# Patient Record
Sex: FEMALE | Race: WHITE | Employment: FULL TIME | URBAN - METROPOLITAN AREA
[De-identification: names, ages, dates, MRNs, and addresses within clinical notes are randomized per-mention and may not be internally consistent; named-entity substitution may affect disease eponyms.]

---

## 2019-01-20 ENCOUNTER — HOSPITAL ENCOUNTER (EMERGENCY)
Age: 59
Discharge: HOME | End: 2019-01-20
Attending: EMERGENCY MEDICINE
Payer: COMMERCIAL

## 2019-01-20 VITALS
RESPIRATION RATE: 12 BRPM | SYSTOLIC BLOOD PRESSURE: 116 MMHG | OXYGEN SATURATION: 98 % | WEIGHT: 250 LBS | DIASTOLIC BLOOD PRESSURE: 63 MMHG | BODY MASS INDEX: 37.89 KG/M2 | HEIGHT: 68 IN | TEMPERATURE: 97.2 F | HEART RATE: 53 BPM

## 2019-01-20 DIAGNOSIS — T18.108A FOREIGN BODY IN ESOPHAGUS, INITIAL ENCOUNTER: Primary | ICD-10-CM

## 2019-01-20 DIAGNOSIS — K22.2 ESOPHAGEAL STRICTURE: ICD-10-CM

## 2019-01-20 DIAGNOSIS — K25.3 ACUTE GASTRIC EROSION: ICD-10-CM

## 2019-01-20 PROCEDURE — 96374 THER/PROPH/DIAG INJ IV PUSH: CPT

## 2019-01-20 PROCEDURE — 43215 ESOPHAGOSCOPY FLEX REMOVE FB: CPT

## 2019-01-20 PROCEDURE — 6360000002 HC RX W HCPCS: Performed by: EMERGENCY MEDICINE

## 2019-01-20 PROCEDURE — 43247 EGD REMOVE FOREIGN BODY: CPT | Performed by: INTERNAL MEDICINE

## 2019-01-20 PROCEDURE — 94770 HC ETCO2 MONITOR DAILY: CPT

## 2019-01-20 PROCEDURE — 3609012800 HC EGD DIAGNOSTIC ONLY: Performed by: INTERNAL MEDICINE

## 2019-01-20 PROCEDURE — 2709999900 HC NON-CHARGEABLE SUPPLY: Performed by: INTERNAL MEDICINE

## 2019-01-20 PROCEDURE — 99152 MOD SED SAME PHYS/QHP 5/>YRS: CPT

## 2019-01-20 PROCEDURE — 99284 EMERGENCY DEPT VISIT MOD MDM: CPT | Performed by: INTERNAL MEDICINE

## 2019-01-20 PROCEDURE — 99283 EMERGENCY DEPT VISIT LOW MDM: CPT

## 2019-01-20 RX ORDER — FENTANYL CITRATE 50 UG/ML
100 INJECTION, SOLUTION INTRAMUSCULAR; INTRAVENOUS ONCE
Status: COMPLETED | OUTPATIENT
Start: 2019-01-20 | End: 2019-01-20

## 2019-01-20 RX ORDER — PANTOPRAZOLE SODIUM 20 MG/1
20 TABLET, DELAYED RELEASE ORAL DAILY
Qty: 30 TABLET | Refills: 0 | Status: SHIPPED | OUTPATIENT
Start: 2019-01-20 | End: 2021-02-22 | Stop reason: HOSPADM

## 2019-01-20 RX ORDER — LEVOTHYROXINE SODIUM 0.03 MG/1
50 TABLET ORAL DAILY
COMMUNITY
End: 2021-03-10

## 2019-01-20 RX ORDER — PROPOFOL 10 MG/ML
INJECTION, EMULSION INTRAVENOUS CONTINUOUS PRN
Status: COMPLETED | OUTPATIENT
Start: 2019-01-20 | End: 2019-01-20

## 2019-01-20 RX ORDER — PROPOFOL 10 MG/ML
10 INJECTION, EMULSION INTRAVENOUS ONCE
Status: COMPLETED | OUTPATIENT
Start: 2019-01-20 | End: 2019-01-20

## 2019-01-20 RX ORDER — BUPROPION HYDROCHLORIDE 300 MG/1
300 TABLET ORAL EVERY MORNING
COMMUNITY
End: 2021-03-10

## 2019-01-20 RX ORDER — SUCRALFATE 1 G/1
1 TABLET ORAL 4 TIMES DAILY
Qty: 60 TABLET | Refills: 0 | Status: SHIPPED | OUTPATIENT
Start: 2019-01-20 | End: 2021-02-22 | Stop reason: HOSPADM

## 2019-01-20 RX ORDER — LISINOPRIL 40 MG/1
40 TABLET ORAL DAILY
COMMUNITY
End: 2021-03-10

## 2019-01-20 RX ADMIN — PROPOFOL 40 MG: 10 INJECTION, EMULSION INTRAVENOUS at 14:01

## 2019-01-20 RX ADMIN — PROPOFOL 20 MG: 10 INJECTION, EMULSION INTRAVENOUS at 14:02

## 2019-01-20 RX ADMIN — FENTANYL CITRATE 50 MCG: 50 INJECTION, SOLUTION INTRAMUSCULAR; INTRAVENOUS at 14:04

## 2019-01-20 ASSESSMENT — ENCOUNTER SYMPTOMS
NAUSEA: 0
WHEEZING: 0
TROUBLE SWALLOWING: 1
RHINORRHEA: 0
ABDOMINAL PAIN: 0
SHORTNESS OF BREATH: 0
COUGH: 0
COLOR CHANGE: 0
VOMITING: 0

## 2021-02-19 NOTE — PROGRESS NOTES
Pre procedure call made Voice mail left. . Pt informed of when and where to arrive, NPO status, visitor and mask policy.

## 2021-02-22 ENCOUNTER — HOSPITAL ENCOUNTER (OUTPATIENT)
Dept: CARDIAC CATH/INVASIVE PROCEDURES | Age: 61
Discharge: HOME | End: 2021-02-22
Payer: COMMERCIAL

## 2021-02-22 VITALS
OXYGEN SATURATION: 98 % | BODY MASS INDEX: 44.41 KG/M2 | SYSTOLIC BLOOD PRESSURE: 140 MMHG | WEIGHT: 293 LBS | HEIGHT: 68 IN | RESPIRATION RATE: 23 BRPM | DIASTOLIC BLOOD PRESSURE: 90 MMHG | HEART RATE: 110 BPM | TEMPERATURE: 98.5 F

## 2021-02-22 LAB
GFR NON-AFRICAN AMERICAN: 52 ML/MIN
GFR SERPL CREATININE-BSD FRML MDRD: >60 ML/MIN
GFR SERPL CREATININE-BSD FRML MDRD: ABNORMAL ML/MIN/{1.73_M2}
GLUCOSE BLD-MCNC: 100 MG/DL (ref 74–100)
PLATELET # BLD: 238 K/UL (ref 138–453)
POC CHLORIDE: 107 MMOL/L (ref 98–107)
POC CREATININE: 1.07 MG/DL (ref 0.51–1.19)
POC HEMATOCRIT: 43 % (ref 36–46)
POC HEMOGLOBIN: 14.7 G/DL (ref 12–16)
POC POTASSIUM: 4.2 MMOL/L (ref 3.5–4.5)
POC SODIUM: 144 MMOL/L (ref 138–146)

## 2021-02-22 PROCEDURE — 6360000004 HC RX CONTRAST MEDICATION

## 2021-02-22 PROCEDURE — 84295 ASSAY OF SERUM SODIUM: CPT

## 2021-02-22 PROCEDURE — C1725 CATH, TRANSLUMIN NON-LASER: HCPCS

## 2021-02-22 PROCEDURE — 82565 ASSAY OF CREATININE: CPT

## 2021-02-22 PROCEDURE — C1894 INTRO/SHEATH, NON-LASER: HCPCS

## 2021-02-22 PROCEDURE — 2580000003 HC RX 258: Performed by: INTERNAL MEDICINE

## 2021-02-22 PROCEDURE — 7100000001 HC PACU RECOVERY - ADDTL 15 MIN

## 2021-02-22 PROCEDURE — 6370000000 HC RX 637 (ALT 250 FOR IP)

## 2021-02-22 PROCEDURE — C1769 GUIDE WIRE: HCPCS

## 2021-02-22 PROCEDURE — 82435 ASSAY OF BLOOD CHLORIDE: CPT

## 2021-02-22 PROCEDURE — 84132 ASSAY OF SERUM POTASSIUM: CPT

## 2021-02-22 PROCEDURE — 93460 R&L HRT ART/VENTRICLE ANGIO: CPT | Performed by: INTERNAL MEDICINE

## 2021-02-22 PROCEDURE — 7100000000 HC PACU RECOVERY - FIRST 15 MIN

## 2021-02-22 PROCEDURE — 2500000003 HC RX 250 WO HCPCS

## 2021-02-22 PROCEDURE — 85014 HEMATOCRIT: CPT

## 2021-02-22 PROCEDURE — 85049 AUTOMATED PLATELET COUNT: CPT

## 2021-02-22 PROCEDURE — C1751 CATH, INF, PER/CENT/MIDLINE: HCPCS

## 2021-02-22 PROCEDURE — 6360000002 HC RX W HCPCS

## 2021-02-22 PROCEDURE — 2709999900 HC NON-CHARGEABLE SUPPLY

## 2021-02-22 PROCEDURE — 82947 ASSAY GLUCOSE BLOOD QUANT: CPT

## 2021-02-22 RX ORDER — SODIUM CHLORIDE 0.9 % (FLUSH) 0.9 %
10 SYRINGE (ML) INJECTION EVERY 12 HOURS SCHEDULED
Status: CANCELLED | OUTPATIENT
Start: 2021-02-22

## 2021-02-22 RX ORDER — FUROSEMIDE 20 MG/1
20 TABLET ORAL DAILY
COMMUNITY

## 2021-02-22 RX ORDER — VERAPAMIL HYDROCHLORIDE 2.5 MG/ML
10 INJECTION, SOLUTION INTRAVENOUS ONCE
Status: COMPLETED | OUTPATIENT
Start: 2021-02-22 | End: 2021-02-22

## 2021-02-22 RX ORDER — SODIUM CHLORIDE 0.9 % (FLUSH) 0.9 %
10 SYRINGE (ML) INJECTION PRN
Status: CANCELLED | OUTPATIENT
Start: 2021-02-22

## 2021-02-22 RX ORDER — SODIUM CHLORIDE 9 MG/ML
INJECTION, SOLUTION INTRAVENOUS CONTINUOUS
Status: DISCONTINUED | OUTPATIENT
Start: 2021-02-22 | End: 2021-02-23 | Stop reason: HOSPADM

## 2021-02-22 RX ORDER — OMEPRAZOLE 20 MG/1
40 CAPSULE, DELAYED RELEASE ORAL DAILY
COMMUNITY
End: 2021-03-10

## 2021-02-22 RX ORDER — ACETAMINOPHEN 325 MG/1
650 TABLET ORAL EVERY 4 HOURS PRN
Status: DISCONTINUED | OUTPATIENT
Start: 2021-02-22 | End: 2021-02-23 | Stop reason: HOSPADM

## 2021-02-22 RX ADMIN — SODIUM CHLORIDE: 9 INJECTION, SOLUTION INTRAVENOUS at 12:34

## 2021-02-22 RX ADMIN — VERAPAMIL HYDROCHLORIDE 10 MG: 2.5 INJECTION, SOLUTION INTRAVENOUS at 13:17

## 2021-02-22 RX ADMIN — ACETAMINOPHEN 650 MG: 325 TABLET ORAL at 18:44

## 2021-02-22 ASSESSMENT — PAIN SCALES - GENERAL: PAINLEVEL_OUTOF10: 3

## 2021-02-22 NOTE — PROGRESS NOTES
Patient admitted, consent signed and questions answered. Patient ready for procedure. Call light to reach with side rails up 2 of 2. Bilateral groin clipped. No one at bedside with patient. History and physical complete.

## 2021-02-22 NOTE — OP NOTE
[facility]    CARDIAC CATHETERIZATION    Date:   2/22/2021  Patient name:  Shi Alas  Date of admission:  2/22/2021 11:44 AM  MRN:   [id number]  YOB: 1960    Operators:  Primary:   Red Ray MD (Attending Physician)        Procedure performed    [x] Left Heart Catheterization. [] Graft Angiography. [x] Left Ventriculography. [x] Right Heart Catheterization. [] Coronary Angiography. [x] Aortic Valve Studies. [] PCI:      [] Other:       Pre Procedure Conscious Sedation Data:  ASA Class:    [x] I [] II [] III [] IV    Mallampati Class:  [x] I [] II [] III [] IV      Indication:  [] STEMI      [x] + Stress test  [] ACS      [] + EKG Changes  [] Non Q MI       [x] Significant Risk Factors  [x] Recurrent Angina             [] Diabetes Mellitus    [] New LBBB      [] Uncontrolled HTN. [] CHF / Low EF changes     [] Abnormal CTA / Ca Score      Procedure:  Access:  [x] Femoral  [] Radial  artery       [x] Right  [] Left    Procedure: After informed consent was obtained with explanation of the risks and benefits, patient was brought to the cath lab. The access area was prepped and draped in sterile fashion. 1% lidocaine was used for local block. The artery was cannulated with 6  Fr sheath with brisk arterial blood return. The side port was frequently flushed and aspirated with normal saline. 1% lidocaine was used for local block. , the Right femoral vein was cannulated with 6  Fr sheath that was exchanged over a guidewire with non-pulsatile dark venous blood return noted. The side port was frequently flushed and aspirated with normal saline. Measurements were taken at the patient's phlebostatic axis. Findings:       Cardiac Arteries and Lesion Findings       LMCA: Normal 0% stenosis. LAD: Normal 0% stenosis. LCx: Normal 0% stenosis.      RCA: Normal 0% stenosis.      Coronary Tree        Dominance: Right       LV Analysis   LV function assessed as:Abnormal. Ejection Fraction   +----------------------------------------------------------------------+---+   ! Method                                                                !EF%! +----------------------------------------------------------------------+---+   ! LV gram                                                               !50 !   +----------------------------------------------------------------------+---+         · Hemodynamic Pressures     Site S/A (mmHg) D/V (mmHg) M/ED (mmHg)   Right Atrium 13 13 10   Right Ventricle 47 2 6   Pulmonary Artery 46 22 31   PCWP 27 23 19         · Oxygen Saturations    Site Oxygen Saturation (%)   Right Atrium NA   Pulmonary Artery Main 65.6   Aorta  92.3         Conclusions        Procedure Summary        Normal coronary arteries    Lower normal LV function    Right heart pressure increase PAP 46/22, PCWP 19 mmHg    Aortic stenosis with mean gradient 61 mmHg and valve area 0.5 cm2    (Calcified aortic valve)        Recommendations        Aortic valve treatment and most likely TAVr due to co-morbid causes,    unless CV surgery think otherwise         Estimated Blood Loss: 10  mL    Conclusions:    ____________________________________________________________________    History and Risk Factors    [x] Hypertension     [] Family history of CAD  [x] Hyperlipidemia     [] Cerebrovascular Disease   [] Prior MI       [] Peripheral Vascular disease   [] Prior PCI              [] Diabetes Mellitus    [] Left Main PCI. [] Currently on Dialysis. [] Prior CABG. [] Currently smoker. [] Cardiac Arrest outside of healthcare facility. [] Yes    [x] No        Witnessed     [] Yes   [] No     Arrest after arrival of EMS  [] Yes   [] No     [] Cardiac Arrest at other Facility. [] Yes   [x] No    Pre-Procedure Information. Heart Failure       [x] Yes    [] No        Class  [] I      [] II  [] III    [] IV.      New Diagnosis    [] Yes  [] No    HF Type      [x] Systolic   []

## 2021-02-22 NOTE — PROGRESS NOTES
Received post procedure to [address] [place] [address] to room 10. Assessment obtained. Restrictions reviewed with patient. Post procedure pathway initiated. Right femoral, venous and arterial  site soft , band aid dry and intact. No hematoma noted. Family at side. Patient without complaints. Head of bed flat with right leg straight.

## 2021-02-23 ENCOUNTER — TELEPHONE (OUTPATIENT)
Dept: CARDIOLOGY CLINIC | Age: 61
End: 2021-02-23

## 2021-02-23 NOTE — TELEPHONE ENCOUNTER
Pt  Is contacted regarding her Cardiac Cath yesterday and her referral to the Structural Heart Team. Pt's diagnosis of Aortic Stenosis discussed in detail. Pt understands her first step in  [address] [facility]  evaluation process is to have an appt with the Cardiothoracic surgeon. Pt is agreeable to referral. CTS office called and referral given. Pt states she is SOB with short distances. She states she walks minimally at home, from chair to bathroom, typically. She reports she has a h/o of knee pain and is overweight. Per Epic her BMI is 45.6. Pt states she does not have a h/o renal insufficiency. She states she had labs done recently per her PCP when starting lasix.      Harry Reyes [facility]

## 2021-02-23 NOTE — PROGRESS NOTES
All discharge instructions reviewed, questions answered, paper signed and given copy. Patient discharged per wheel chair with daughter and belongings.

## 2021-03-09 PROBLEM — I50.43 CHF (CONGESTIVE HEART FAILURE), NYHA CLASS III, ACUTE ON CHRONIC, COMBINED (HCC): Status: ACTIVE | Noted: 2020-12-29

## 2021-03-09 PROBLEM — I35.0 AORTIC STENOSIS, SEVERE: Status: ACTIVE | Noted: 2020-12-29

## 2021-03-09 PROBLEM — N39.46 MIXED STRESS AND URGE URINARY INCONTINENCE: Status: ACTIVE | Noted: 2020-11-02

## 2021-03-09 PROBLEM — G47.00 INSOMNIA: Status: ACTIVE | Noted: 2017-12-15

## 2021-03-09 PROBLEM — G47.30 SLEEP APNEA: Status: ACTIVE | Noted: 2020-11-02

## 2021-03-09 PROBLEM — Z79.01 ANTICOAGULATED: Status: ACTIVE | Noted: 2020-12-29

## 2021-03-09 PROBLEM — D50.9 IRON DEFICIENCY ANEMIA: Status: ACTIVE | Noted: 2020-12-29

## 2021-03-09 NOTE — PATIENT INSTRUCTIONS
Schedule a Vaccine  When you qualify to receive the vaccine, call the [facility]) COVID-19 Vaccination Hotline to schedule your appointment or to get additional information about the [facility]) locations which are offering the COVID-19 vaccine. To be 94% effective, it's important that you receive two doses of one of the COVID-19 vaccines. -If you are receiving the Jennings Peter vaccine, your second shot will be scheduled as close to 21 days after the first shot as possible. -If you are receiving the Moderna vaccine, your second shot will be scheduled as close to 28 days after the first shot as possible. [facility]) COVID-19 Vaccination Hotline: [phone]    Links to [facility]) website and [facility] website:    [employer]/mercy-health-monitoring-coronavirus-covid-19/covid-19-vaccine/[place]/price-vaccine    https://Madmagz/covidvaccine    Schedule a Vaccine  When you qualify to receive the vaccine per the [address] guidelines, call the [facility]) COVID-19 Vaccination Hotline to schedule your appointment or to get additional information about the [facility] locations which are offering the COVID-19 vaccine. To be most effective, it's important that you receive two doses of one of the COVID-19 vaccines. -If you are receiving the Jennings Peter vaccine, your second shot will be scheduled as close to 21 days after the first shot as possible. -If you are receiving the Moderna vaccine, your second shot will be scheduled as close to 28 days after the first shot as possible. [facility]) COVID-19 Vaccination Hotline: [phone]    Savana Hester to the [facility]) website:    [employer]/mercy-health-monitoring-coronavirus-covid-19/covid-19-vaccine/[place]/price-vaccine  Patient Education        Aortic Valve Stenosis: Care Instructions  Your Care Instructions     Having aortic valve stenosis means that the valve between your heart and the large blood vessel that carries blood to the body (aorta) has narrowed. That forces the heart to pump harder to get enough blood through the valve. As stenosis gets worse, the valve gets narrower. This can cause symptoms. Symptoms include chest pain, dizziness, fainting, or shortness of breath. If you have mild or moderate stenosis and don't have symptoms, you may not need treatment now. Your doctor will check your heart regularly. You may need treatment if the stenosis gets worse. With severe stenosis, you may need to have the valve replaced. Follow-up care is a key part of your treatment and safety. Be sure to make and go to all appointments, and call your doctor if you are having problems. It's also a good idea to know your test results and keep a list of the medicines you take. How can you care for yourself at home? · Be safe with medicines. Take your medicines exactly as prescribed. Call your doctor if you think you are having a problem with your medicine. You will get more details on the specific medicines your doctor prescribes. · Eat a heart-healthy diet. Limit how much sodium you eat. · Be active. Ask your doctor what type and level of exercise is safe for you. · Do not smoke. Smoking can make aortic valve stenosis worse. If you need help quitting, talk to your doctor about stop-smoking programs and medicines. · Stay at a healthy weight. Lose weight if you need to. · Avoid colds and flu. Get a pneumococcal vaccine shot. If you have had one before, ask your doctor if you need another dose. Get a flu vaccine every year. · Take care of your teeth and gums. Get regular dental checkups. Good dental health is important because bacteria can spread from infected teeth and gums to the heart valves. When should you call for help? Call 911 anytime you think you may need emergency care. For example, call if:    · You passed out (lost consciousness).     · You have symptoms of a heart attack.  These may include: ? Chest pain or pressure, or a strange feeling in the chest.  ? Sweating. ? Shortness of breath. ? Nausea or vomiting. ? Pain, pressure, or a strange feeling in the back, neck, jaw, or upper belly or in one or both shoulders or arms. ? Lightheadedness or sudden weakness. ? A fast or irregular heartbeat. After you call 911, the  may tell you to chew 1 adult-strength or 2 to 4 low-dose aspirin. Wait for an ambulance. Do not try to drive yourself. Call your doctor now or seek immediate medical care if:    · You develop new symptoms of aortic valve stenosis, such as chest pain, dizziness, or shortness of breath.     · You have new or increased shortness of breath.     · You are dizzy or lightheaded, or you feel like you may faint.     · You have sudden weight gain, such as more than 2 to 3 pounds in a day or 5 pounds in a week. (Your doctor may suggest a different range of weight gain.)     · You have increased swelling in your legs, ankles, or feet. Watch closely for changes in your health, and be sure to contact your doctor if:    · You have trouble making healthy lifestyle changes.     · You want more information about healthy lifestyle changes. Where can you learn more? Go to [email or web address]://[employer]. org and sign in to your [email or web address] account. Enter N862 in the [facility] box to learn more about \"Aortic Valve Stenosis: Care Instructions. \"     If you do not have an account, please click on the \"Sign Up Now\" link. Current as of: December 16, 2019               Content Version: 12.6  © 9867-4482 [employer]. Care instructions adapted under license by [facility] ([facility]). If you have questions about a medical condition or this instruction, always ask your healthcare professional. [place] [address] any warranty or liability for your use of this information.

## 2021-03-09 NOTE — PROGRESS NOTES
[facility] Cardiothoracic Surgery  CONSULTATION      CC: Severe aortic stenosis    HPI:  Ms. Eileen Santiago is a 57 y.o.  female who presents s/p elective cardiac catheterization with Dr. Nika An on 2/22/2021. Pertinent medical history includes severe aortic stenosis, chronic combined CHF, atrial fibrillation with chronic anticoagulation, obstructive sleep apnea, hypothyroidism, hypertension, GERD, diverticulitis, iron deficiency anemia, depression and obesity. Cardiac workup has revealed severe aortic stenosis with a mean gradient of 61 mmHg and valve area of 0.5 cm² in the absence of coronary artery disease. Patient admits to dyspnea on exertion, BLE edema and fatigue. She denies chest pain. She has been referred for consideration of SAVR versus TAVR. PMH:   has a past medical history of Anxiety, Atrial fibrillation ([address] Depression, Hyperlipidemia, Hypertension, and Thyroid disease. PSH:   has a past surgical history that includes Cholecystectomy; Upper gastrointestinal endoscopy (N/A, 1/20/2019); and Cardiac catheterization (02/22/2021). Allergies:     Allergies   Allergen Reactions    Codeine     Pcn [Penicillins]        Medications:  Current Outpatient Medications:     apixaban (ELIQUIS) 5 MG TABS tablet, Take 5 mg by mouth 2 times daily, Disp: , Rfl:     omeprazole (PRILOSEC) 20 MG delayed release capsule, Take 40 mg by mouth Daily, Disp: , Rfl:     furosemide (LASIX) 20 MG tablet, Take 20 mg by mouth daily, Disp: , Rfl:     metoprolol tartrate (LOPRESSOR) 25 MG tablet, Take 25 mg by mouth 2 times daily, Disp: , Rfl:     lisinopril (PRINIVIL;ZESTRIL) 40 MG tablet, Take 40 mg by mouth daily, Disp: , Rfl:     buPROPion (WELLBUTRIN XL) 300 MG extended release tablet, Take 300 mg by mouth every morning, Disp: , Rfl:     levothyroxine (SYNTHROID) 25 MCG tablet, Take 50 mcg by mouth Daily , Disp: , Rfl:     SPIRONOLACTONE PO, Take by mouth, Disp: , Rfl:     Social Hx:    reports that she has never smoked. She has never used smokeless tobacco.    Family Hx:  family history is not on file. ROS:    Review of Systems   Constitutional: Positive for fatigue. Negative for chills and fever. HENT: Negative for congestion. Eyes: Negative for visual disturbance. Respiratory: Negative for shortness of breath. Cardiovascular: Positive for leg swelling. Negative for chest pain. Gastrointestinal: Negative for abdominal pain, constipation and diarrhea. Genitourinary: Negative for dysuria. Musculoskeletal: Negative for back pain. Skin: Negative for color change. Neurological: Positive for weakness. Negative for dizziness. Generalized weakness and activity intolerance   Psychiatric/Behavioral: Negative for self-injury. The patient is not nervous/anxious. Physical Examination    Vitals: There were no vitals filed for this visit. Physical Exam  Vitals signs reviewed. Constitutional:       General: She is not in acute distress. Appearance: Normal appearance. She is obese. She is not ill-appearing. HENT:      Head: Normocephalic. Nose: Nose normal.      Mouth/Throat:      Mouth: Mucous membranes are moist.      Pharynx: Oropharynx is clear. Eyes:      Extraocular Movements: Extraocular movements intact. Conjunctiva/sclera: Conjunctivae normal.      Pupils: Pupils are equal, round, and reactive to light. Neck:      Musculoskeletal: Normal range of motion. Cardiovascular:      Rate and Rhythm: Normal rate and regular rhythm. Pulses: Normal pulses. Heart sounds: Normal heart sounds. Pulmonary:      Effort: Pulmonary effort is normal. No respiratory distress. Breath sounds: Normal breath sounds. Abdominal:      Palpations: Abdomen is soft. Tenderness: There is no abdominal tenderness. Musculoskeletal: Normal range of motion. Right lower leg: Edema present. Left lower leg: Edema present. Skin:     General: Skin is warm and dry. Neurological:      General: No focal deficit present. Mental Status: She is alert and oriented to person, place, and time. Psychiatric:         Mood and Affect: Mood normal.         Behavior: Behavior normal.         Labs:   CBC: No results for input(s): WBC, HGB, HCT, MCV, PLT in the last 72 hours. BMP:No results for input(s): NA, K, CL, CO2, PHOS, BUN, CREATININE, MG in the last 72 hours. Invalid input(s): CA  Accucheck Glucoses:No results for input(s): POCGLU in the last 72 hours. Cardiac Enzymes: No results for input(s): CKTOTAL, CKMB, CKMBINDEX, TROPONINI in the last 72 hours. PT/INR: No results for input(s): PROTIME, INR in the last 72 hours. APTT: No results for input(s): APTT in the last 72 hours. Liver Profile:  No results found for: AST, ALT, ALB, BILIDIR, BILITOT, ALKPHOS, GGT, 5NUCNo results found for: CHOL, HDL, TRIG  TSH: No results found for: TSH  UA: No results found for: NITRITE, COLORU, PHUR, LABCAST, [address], RBCUA, MUCUS, TRICHOMONAS, YEAST, BACTERIA, CLARITYU, SPECGRAV, LEUKOCYTESUR, UROBILINOGEN, BILIRUBINUR, BLOODU, GLUCOSEU, AMORPHOUS      Testing:  Cardiac cath:   Normal coronary arteries   Lower normal LV function   Right heart pressure increase PAP 46/22, PCWP 19 mmHg   Aortic stenosis with mean gradient 61 mmHg and valve area 0.5 cm2   (Calcified aortic valve)      Recommendations      Aortic valve treatment and most likely TAVR due to co-morbid causes,   unless CV surgery think otherwise      Signature      ----------------------------------------------------------------   Electronically signed by Francisco Busby(Performing Physician) on   02/22/2021 17:06   ----------------------------------------------------------------      Angiographic Findings: Cardiac Arteries and Lesion Findings     LMCA: Normal 0% stenosis. LAD: Normal 0% stenosis. LCx: Normal 0% stenosis. RCA: Normal 0% stenosis.       Coronary Tree Dominance: Right     LV Analysis  LV function assessed leaflets are calcified. Mitral annular calcification is      moderate. Moderate mitral regurgitation. Tricuspid Valve     The tricuspid valve is normal in structure. Mild tricuspid      regurgitation. The RVSP is 43 mmHg. Pulmonic Valve     The pulmonary valve is grossly normal.          Great Vessels     Aortic root is mildly dilated. Pericardium     There is no pericardial effusion. Imaging Studies:  I have personally reviewed the testing/imaging, and agree with the findings listed above. In summary, Ms. Grady Odell is a 57y.o. year-old female with severe aortic stenosis. Problem List:    Severe aortic stenosis  o Mean gradient of 61 mmHg, LINDA of 0.5 cm²   Chronic combined systolic and diastolic CHF  o NYHA class III   Atrial fibrillation, chronic   Chronic anticoagulation secondary to above on Eliquis   Obstructive sleep apnea   Hypothyroidism   Chronic pulmonary heart disease   Essential hypertension   Diverticulitis   GERD   Large hiatal hernia   Esophagitis, Merrill grade C with ulceration   Depression   Insomnia   Morbid obesity, BMI 48.96 kg/m²    Calculated STS:        Recommendation: Per discussion with Dr. Danay Richardson, he feels Ms. Grady Odell would benefit optimally from TAVR due to patient's co-morbidities and rehabilitation potential.  Patient will need full pulmonary function test with bronchodilator and CT chest without contrast.  [facility] from [facility].    I have discussed the proposed procedure, along with its risk, benefits, and therapeutic alternatives. Specific risks discussedincluded death, stroke, mediastinitis, re-operation for bleeding, and atrial fibrillation. We have also discussed the potential need for blood transfusion, along with its risks and benefits. She appears to understand,and consents to proceed.   Per discussion with Dr Danay Richardson, patient to review both surgical interventions over the next few days with plan to reconvene via telephone next Monday for decision. On this date 3/10/2021 I have spent 60 minutes reviewing previous notes, test results and face to face with the patient discussing the diagnosis and importance of compliance with the treatment plan as well as documenting on the day of the visit.     Rebeca Perez, RADHA - CNP

## 2021-03-10 ENCOUNTER — TELEPHONE (OUTPATIENT)
Dept: CARDIOLOGY CLINIC | Age: 61
End: 2021-03-10

## 2021-03-10 ENCOUNTER — TELEPHONE (OUTPATIENT)
Dept: CARDIOTHORACIC SURGERY | Age: 61
End: 2021-03-10

## 2021-03-10 ENCOUNTER — INITIAL CONSULT (OUTPATIENT)
Dept: CARDIOTHORACIC SURGERY | Age: 61
End: 2021-03-10
Payer: COMMERCIAL

## 2021-03-10 VITALS
TEMPERATURE: 97.7 F | BODY MASS INDEX: 44.41 KG/M2 | SYSTOLIC BLOOD PRESSURE: 109 MMHG | DIASTOLIC BLOOD PRESSURE: 80 MMHG | HEIGHT: 68 IN | HEART RATE: 98 BPM | WEIGHT: 293 LBS

## 2021-03-10 DIAGNOSIS — I27.9 CHRONIC PULMONARY HEART DISEASE (HCC): ICD-10-CM

## 2021-03-10 DIAGNOSIS — I35.0 SEVERE AORTIC STENOSIS BY PRIOR ECHOCARDIOGRAM: Primary | ICD-10-CM

## 2021-03-10 PROCEDURE — G8427 DOCREV CUR MEDS BY ELIG CLIN: HCPCS | Performed by: NURSE PRACTITIONER

## 2021-03-10 PROCEDURE — 1036F TOBACCO NON-USER: CPT | Performed by: NURSE PRACTITIONER

## 2021-03-10 PROCEDURE — G8417 CALC BMI ABV UP PARAM F/U: HCPCS | Performed by: NURSE PRACTITIONER

## 2021-03-10 PROCEDURE — 3017F COLORECTAL CA SCREEN DOC REV: CPT | Performed by: NURSE PRACTITIONER

## 2021-03-10 PROCEDURE — 99214 OFFICE O/P EST MOD 30 MIN: CPT | Performed by: NURSE PRACTITIONER

## 2021-03-10 PROCEDURE — G8484 FLU IMMUNIZE NO ADMIN: HCPCS | Performed by: NURSE PRACTITIONER

## 2021-03-10 RX ORDER — SPIRONOLACTONE 25 MG/1
TABLET ORAL
COMMUNITY
Start: 2021-02-10 | End: 2021-03-10

## 2021-03-10 RX ORDER — MINOCYCLINE HYDROCHLORIDE 50 MG/1
50 CAPSULE ORAL 2 TIMES DAILY
Status: ON HOLD | COMMUNITY
Start: 2020-12-03 | End: 2021-04-10 | Stop reason: HOSPADM

## 2021-03-10 RX ORDER — BUPROPION HYDROCHLORIDE 150 MG/1
150 TABLET ORAL DAILY
COMMUNITY
Start: 2020-07-20 | End: 2021-07-20

## 2021-03-10 RX ORDER — OMEPRAZOLE 40 MG/1
40 CAPSULE, DELAYED RELEASE ORAL DAILY
COMMUNITY
Start: 2020-11-09 | End: 2021-05-08

## 2021-03-10 RX ORDER — LEVOTHYROXINE SODIUM 0.05 MG/1
TABLET ORAL
COMMUNITY
Start: 2021-03-06

## 2021-03-10 RX ORDER — LISINOPRIL 20 MG/1
TABLET ORAL
COMMUNITY
Start: 2021-02-10 | End: 2021-03-10

## 2021-03-10 ASSESSMENT — ENCOUNTER SYMPTOMS
CONSTIPATION: 0
DIARRHEA: 0
BACK PAIN: 0
SHORTNESS OF BREATH: 0
ABDOMINAL PAIN: 0
COLOR CHANGE: 0

## 2021-03-10 NOTE — TELEPHONE ENCOUNTER
Spoke to Radiology at [facility]. They state they will send us the ECHO from January stat on patient.

## 2021-03-15 ENCOUNTER — TELEPHONE (OUTPATIENT)
Dept: CARDIOLOGY CLINIC | Age: 61
End: 2021-03-15

## 2021-03-15 NOTE — TELEPHONE ENCOUNTER
Writer discussed pt's options of SAVR vs TAVR with Dr Tawana Salter via telephone. Of concern is pt's morbid obesity, Wt of 322 lbs/BMI = 49, and pt's  limited ambulation. Patient is considered low risk. The patient does carry comorbidities that would make her an appropriate TAVR candidate. The pt previously expressed her preference is  TAVR over SAVR. However , due to the pt's age of only 57, there is no guarantee she may not need yet another aortic valve procedure during her 62s. There is limited information on placing TAVR valves in 58year old females, and thus, we cannot predict the lifespan of an initial TAVR valve. There exists the possibility of an initial TAVR valve not lasting until the pt turns 78,  at which time a surgeon may again consider SAVR vs TAVR. All of this information is shared and discussed with the pt in layman's terms. She verbalizes understanding and states she would like to think about her decision for a few more days. She plans to contact writer later this week, when her final decision is made.      Taylor Garcia [facility]

## 2021-03-16 ENCOUNTER — TELEPHONE (OUTPATIENT)
Dept: VASCULAR SURGERY | Age: 61
End: 2021-03-16

## 2021-03-16 ENCOUNTER — TELEPHONE (OUTPATIENT)
Dept: CARDIOLOGY | Age: 61
End: 2021-03-16

## 2021-03-16 ENCOUNTER — TELEPHONE (OUTPATIENT)
Dept: CARDIOTHORACIC SURGERY | Age: 61
End: 2021-03-16

## 2021-03-16 NOTE — TELEPHONE ENCOUNTER
Spoke to Carleen Carmona at [facility] regarding scheduling pt for her PFTs. Carleen Carmona called patient to give her date options and times for this week upcoming. Phone went right to VM.  Carleen Carmona states she left a detailed VM for pt to call her or call the office back

## 2021-03-16 NOTE — TELEPHONE ENCOUNTER
----- Message from Pacheco Ugalde MA sent at 3/16/2021  3:35 PM EDT -----  Regarding: RE: Pre-op Testing  LM on Guillermina VM informing her   ----- Message -----  From: RADHA Tan CNP  Sent: 3/16/2021   2:28 PM EDT  To: RADHA Jiang CNP, #  Subject: RE: Pre-op Testing                               Please touch base with Guillermina MANUEL or Alyssa Morrissey NP to make sure they are aware and placing pre-op testing orders. Thanks,  Candance Amabile NP    ----- Message -----  From: Pacheco Ugalde MA  Sent: 3/16/2021  11:11 AM EDT  To: RADHA Tan CNP, #  Subject: RE: Pre-op Testing                               Spoke to patient and she has decided to go with the TAVR.  ----- Message -----  From: RADHA Tan CNP  Sent: 3/16/2021  10:40 AM EDT  To: [facility] Clinical Staff  Subject: FW: Pre-op Testing                               Per our discussion, I believe it is best to wait until the patient makes a decision between TAVR versus SAVR to schedule any further testing. PFTs are not needed and a different CT is ordered per structural heart team if patient decides on TAVR. Thanks,   Candance Amabile NP  ----- Message -----  From: Nimisha Richards MA  Sent: 3/16/2021   9:31 AM EDT  To: RADHA Tan CNP, #  Subject: RE: Pre-op Testing                               Spoke to Guillermina she states that they spoke to patient and she is deciding on if she would want open or to follow with the structural heart team. Scheduled her PFTs but Guillermina said they normally do a CT Chest Abdm Pelvis w/ Contrast. Should we do that instead?  ----- Message -----  From: RADHA Tan CNP  Sent: 3/10/2021   2:08 PM EDT  To: Nimisha Richards MA, #  Subject: RE: Pre-op Testing                               Orders were entered and signed earlier.  [place] and would like testing close to home if possible    ----- Message -----  From: Nimisha Richards MA  Sent: 3/10/2021   2:00 PM EST  To: RADHA Arauz CNP  Subject: RE: Pre-op Testing                               No but if you put the orders in I can get it scheduled. ----- Message -----  From: RADHA Arauz CNP  Sent: 3/10/2021   1:12 PM EST  To: Tammy Guerra MA  Subject: Pre-op Testing                                   Did the Ct Chest and PFTs get scheduled for this patient?

## 2021-03-17 ENCOUNTER — TELEPHONE (OUTPATIENT)
Dept: CARDIOLOGY CLINIC | Age: 61
End: 2021-03-17

## 2021-03-17 DIAGNOSIS — I35.0 AORTIC VALVE STENOSIS, ETIOLOGY OF CARDIAC VALVE DISEASE UNSPECIFIED: Primary | ICD-10-CM

## 2021-03-17 DIAGNOSIS — R42 DIZZINESS AND GIDDINESS: ICD-10-CM

## 2021-03-19 ENCOUNTER — PRE-PROCEDURE TELEPHONE (OUTPATIENT)
Dept: PREADMISSION TESTING | Age: 61
End: 2021-03-19

## 2021-03-19 NOTE — TELEPHONE ENCOUNTER
Spoke with patient and confirmed a covid swab appt for March 22nd at 1030 for patients procedure on March 25th , and April 1st at 1030, for her procedure on April 6th. Instructions provided, verbalizes understanding.

## 2021-03-22 ENCOUNTER — HOSPITAL ENCOUNTER (OUTPATIENT)
Dept: PREADMISSION TESTING | Age: 61
Setting detail: SPECIMEN
Discharge: HOME | End: 2021-03-26
Payer: COMMERCIAL

## 2021-03-22 DIAGNOSIS — Z11.59 ENCOUNTER FOR SCREENING FOR OTHER VIRAL DISEASES: Primary | ICD-10-CM

## 2021-03-22 PROCEDURE — U0003 INFECTIOUS AGENT DETECTION BY NUCLEIC ACID (DNA OR RNA); SEVERE ACUTE RESPIRATORY SYNDROME CORONAVIRUS 2 (SARS-COV-2) (CORONAVIRUS DISEASE [COVID-19]), AMPLIFIED PROBE TECHNIQUE, MAKING USE OF HIGH THROUGHPUT TECHNOLOGIES AS DESCRIBED BY CMS-2020-01-R: HCPCS

## 2021-03-22 PROCEDURE — U0005 INFEC AGEN DETEC AMPLI PROBE: HCPCS

## 2021-03-23 LAB
SARS-COV-2: NORMAL
SARS-COV-2: NOT DETECTED
SOURCE: NORMAL

## 2021-03-24 ENCOUNTER — TELEPHONE (OUTPATIENT)
Dept: CARDIOLOGY CLINIC | Age: 61
End: 2021-03-24

## 2021-03-24 ENCOUNTER — TELEPHONE (OUTPATIENT)
Dept: PRIMARY CARE CLINIC | Age: 61
End: 2021-03-24

## 2021-03-24 DIAGNOSIS — I35.0 AORTIC VALVE STENOSIS, ETIOLOGY OF CARDIAC VALVE DISEASE UNSPECIFIED: Primary | ICD-10-CM

## 2021-03-24 NOTE — TELEPHONE ENCOUNTER
Pt was called, she was asked to arrive at [facility] at [address] [facility] . CT imaging, PFT and carotids planned for the am. Pt will be met by writer at Registration at the [address] [place] [address]. Patient verbalizes understanding.

## 2021-03-25 ENCOUNTER — HOSPITAL ENCOUNTER (OUTPATIENT)
Dept: VASCULAR LAB | Age: 61
Discharge: HOME | End: 2021-03-25
Payer: COMMERCIAL

## 2021-03-25 ENCOUNTER — HOSPITAL ENCOUNTER (OUTPATIENT)
Dept: CT IMAGING | Age: 61
Discharge: HOME | End: 2021-03-27
Payer: COMMERCIAL

## 2021-03-25 ENCOUNTER — HOSPITAL ENCOUNTER (OUTPATIENT)
Dept: PULMONOLOGY | Age: 61
Discharge: HOME | End: 2021-03-25
Payer: COMMERCIAL

## 2021-03-25 ENCOUNTER — HOSPITAL ENCOUNTER (OUTPATIENT)
Dept: CARDIOLOGY CLINIC | Age: 61
Discharge: HOME | End: 2021-03-25
Payer: COMMERCIAL

## 2021-03-25 DIAGNOSIS — I35.0 AORTIC STENOSIS, SEVERE: Primary | ICD-10-CM

## 2021-03-25 DIAGNOSIS — I50.42 CHF NYHA CLASS III, CHRONIC, COMBINED (HCC): ICD-10-CM

## 2021-03-25 DIAGNOSIS — I35.0 AORTIC VALVE STENOSIS, ETIOLOGY OF CARDIAC VALVE DISEASE UNSPECIFIED: ICD-10-CM

## 2021-03-25 DIAGNOSIS — G47.30 SLEEP APNEA, UNSPECIFIED TYPE: ICD-10-CM

## 2021-03-25 DIAGNOSIS — I27.9 CHRONIC PULMONARY HEART DISEASE (HCC): ICD-10-CM

## 2021-03-25 DIAGNOSIS — E66.01 MORBID OBESITY WITH BMI OF 45.0-49.9, ADULT (HCC): ICD-10-CM

## 2021-03-25 DIAGNOSIS — R42 DIZZINESS AND GIDDINESS: ICD-10-CM

## 2021-03-25 DIAGNOSIS — L71.9 ROSACEA: ICD-10-CM

## 2021-03-25 DIAGNOSIS — I34.0 MODERATE MITRAL REGURGITATION: ICD-10-CM

## 2021-03-25 LAB
GFR NON-AFRICAN AMERICAN: 49 ML/MIN
GFR SERPL CREATININE-BSD FRML MDRD: 59 ML/MIN
GFR SERPL CREATININE-BSD FRML MDRD: ABNORMAL ML/MIN/{1.73_M2}
POC CREATININE: 1.13 MG/DL (ref 0.51–1.19)

## 2021-03-25 PROCEDURE — 99215 OFFICE O/P EST HI 40 MIN: CPT | Performed by: NURSE PRACTITIONER

## 2021-03-25 PROCEDURE — 71275 CT ANGIOGRAPHY CHEST: CPT

## 2021-03-25 PROCEDURE — 94664 DEMO&/EVAL PT USE INHALER: CPT

## 2021-03-25 PROCEDURE — 94726 PLETHYSMOGRAPHY LUNG VOLUMES: CPT

## 2021-03-25 PROCEDURE — 82565 ASSAY OF CREATININE: CPT

## 2021-03-25 PROCEDURE — 94640 AIRWAY INHALATION TREATMENT: CPT

## 2021-03-25 PROCEDURE — 94060 EVALUATION OF WHEEZING: CPT

## 2021-03-25 PROCEDURE — 6360000004 HC RX CONTRAST MEDICATION: Performed by: NURSE PRACTITIONER

## 2021-03-25 PROCEDURE — 75572 CT HRT W/3D IMAGE: CPT

## 2021-03-25 PROCEDURE — 2709999900 HC NON-CHARGEABLE SUPPLY

## 2021-03-25 PROCEDURE — 94729 DIFFUSING CAPACITY: CPT

## 2021-03-25 PROCEDURE — 93880 EXTRACRANIAL BILAT STUDY: CPT

## 2021-03-25 RX ORDER — LISINOPRIL 20 MG/1
20 TABLET ORAL DAILY
COMMUNITY

## 2021-03-25 RX ADMIN — IOPAMIDOL 180 ML: 755 INJECTION, SOLUTION INTRAVENOUS at 09:51

## 2021-03-25 ASSESSMENT — ENCOUNTER SYMPTOMS
COUGH: 0
BLOOD IN STOOL: 0
SHORTNESS OF BREATH: 1
ABDOMINAL PAIN: 0
EYE DISCHARGE: 0

## 2021-03-30 ENCOUNTER — TELEPHONE (OUTPATIENT)
Dept: CARDIOLOGY | Age: 61
End: 2021-03-30

## 2021-03-30 PROBLEM — E66.01 MORBID OBESITY WITH BMI OF 45.0-49.9, ADULT (HCC): Status: ACTIVE | Noted: 2021-03-30

## 2021-03-30 PROBLEM — I34.0 MODERATE MITRAL REGURGITATION: Status: ACTIVE | Noted: 2021-03-30

## 2021-03-30 LAB
ANION GAP SERPL CALCULATED.3IONS-SCNC: 11 MEQ/L (ref 5–13)
BUN BLDV-MCNC: 14 MG/DL (ref 7–18)
CALCIUM SERPL-MCNC: 9 MG/DL (ref 8.4–10.2)
CHLORIDE BLD-SCNC: 108 MMOL/L (ref 98–107)
CO2: 25 MMOL/L (ref 23–31)
CREAT SERPL-MCNC: 1.07 MG/DL (ref 0.57–1.11)
GFR CALCULATED: 55
GLUCOSE: 97 MG/DL (ref 70–99)
POTASSIUM SERPL-SCNC: 4.1 MMOL/L (ref 3.5–5.1)
SODIUM BLD-SCNC: 144 MMOL/L (ref 136–145)

## 2021-03-30 RX ORDER — SPIRONOLACTONE 25 MG/1
25 TABLET ORAL DAILY
COMMUNITY

## 2021-03-30 NOTE — TELEPHONE ENCOUNTER
Pt had told writer at [facility] [place] appt that her AVS from [facility] office visit told her to stop spironolactone, so she stopped it for that reason. This was investigated by writer, and it appears the med was marked as Stopped by another Provider, when pt was at [facility]. Pt states she was put on the med approx 10 yrs ago for BP management, and although the pt herself has stopped the med of her own accord in the past, no Provider has ever taken her off of it. Pt has noticed increased edema of b/l LE since stopping it on 3/10/21, day of CTS OV. She was advised to resume med and to monitor for improved edema status. Will contact pt  Again on Mon 4/5 , prior to TAVR procedure. Awaiting pt's pending labs , which were drawn today.       Aditi Garcia [facility]

## 2021-04-01 ENCOUNTER — HOSPITAL ENCOUNTER (OUTPATIENT)
Dept: PREADMISSION TESTING | Age: 61
Setting detail: SPECIMEN
Discharge: HOME | End: 2021-04-05
Payer: COMMERCIAL

## 2021-04-01 DIAGNOSIS — Z11.59 ENCOUNTER FOR SCREENING FOR OTHER VIRAL DISEASES: Primary | ICD-10-CM

## 2021-04-01 PROCEDURE — U0005 INFEC AGEN DETEC AMPLI PROBE: HCPCS

## 2021-04-01 PROCEDURE — U0003 INFECTIOUS AGENT DETECTION BY NUCLEIC ACID (DNA OR RNA); SEVERE ACUTE RESPIRATORY SYNDROME CORONAVIRUS 2 (SARS-COV-2) (CORONAVIRUS DISEASE [COVID-19]), AMPLIFIED PROBE TECHNIQUE, MAKING USE OF HIGH THROUGHPUT TECHNOLOGIES AS DESCRIBED BY CMS-2020-01-R: HCPCS

## 2021-04-02 LAB
SARS-COV-2: NORMAL
SARS-COV-2: NOT DETECTED
SOURCE: NORMAL

## 2021-04-03 ENCOUNTER — TELEPHONE (OUTPATIENT)
Dept: PRIMARY CARE CLINIC | Age: 61
End: 2021-04-03

## 2021-04-05 ENCOUNTER — PREP FOR PROCEDURE (OUTPATIENT)
Dept: OTHER | Age: 61
End: 2021-04-05

## 2021-04-05 RX ORDER — VANCOMYCIN HYDROCHLORIDE 1 G/200ML
1000 INJECTION, SOLUTION INTRAVENOUS EVERY 12 HOURS
Status: CANCELLED | OUTPATIENT
Start: 2021-04-05 | End: 2021-04-06

## 2021-04-05 RX ORDER — SODIUM CHLORIDE 9 MG/ML
INJECTION, SOLUTION INTRAVENOUS CONTINUOUS
Status: CANCELLED | OUTPATIENT
Start: 2021-04-05

## 2021-04-05 RX ORDER — CLOPIDOGREL 300 MG/1
300 TABLET, FILM COATED ORAL ONCE
Status: CANCELLED | OUTPATIENT
Start: 2021-04-05 | End: 2021-04-05

## 2021-04-05 RX ORDER — SODIUM CHLORIDE 9 MG/ML
INJECTION, SOLUTION INTRAVENOUS PRN
Status: CANCELLED | OUTPATIENT
Start: 2021-04-05

## 2021-04-06 ENCOUNTER — HOSPITAL ENCOUNTER (INPATIENT)
Dept: CARDIAC CATH/INVASIVE PROCEDURES | Age: 61
LOS: 4 days | Discharge: HOME HEALTH | DRG: 266 | End: 2021-04-10
Attending: INTERNAL MEDICINE | Admitting: INTERNAL MEDICINE
Payer: COMMERCIAL

## 2021-04-06 ENCOUNTER — ANESTHESIA EVENT (OUTPATIENT)
Dept: CARDIAC CATH/INVASIVE PROCEDURES | Age: 61
End: 2021-04-06

## 2021-04-06 ENCOUNTER — APPOINTMENT (OUTPATIENT)
Dept: GENERAL RADIOLOGY | Age: 61
DRG: 266 | End: 2021-04-06
Attending: INTERNAL MEDICINE
Payer: COMMERCIAL

## 2021-04-06 ENCOUNTER — ANESTHESIA (OUTPATIENT)
Dept: CARDIAC CATH/INVASIVE PROCEDURES | Age: 61
End: 2021-04-06

## 2021-04-06 VITALS — SYSTOLIC BLOOD PRESSURE: 110 MMHG | TEMPERATURE: 97.5 F | OXYGEN SATURATION: 100 % | DIASTOLIC BLOOD PRESSURE: 67 MMHG

## 2021-04-06 DIAGNOSIS — Z95.2 S/P TAVR (TRANSCATHETER AORTIC VALVE REPLACEMENT): ICD-10-CM

## 2021-04-06 DIAGNOSIS — N63.0 BREAST NODULE: ICD-10-CM

## 2021-04-06 PROBLEM — E04.1 THYROID NODULE: Status: ACTIVE | Noted: 2021-04-06

## 2021-04-06 LAB
% CKMB: 1.5 % (ref 0–3)
-: NORMAL
ACTIVATED CLOTTING TIME: 266 SEC (ref 79–149)
ALBUMIN SERPL-MCNC: 4.1 G/DL (ref 3.5–5.2)
AMORPHOUS: NORMAL
ANION GAP SERPL CALCULATED.3IONS-SCNC: 14 MMOL/L (ref 9–17)
ANION GAP SERPL CALCULATED.3IONS-SCNC: 7 MMOL/L (ref 9–17)
BACTERIA: NORMAL
BILIRUB SERPL-MCNC: 0.34 MG/DL (ref 0.3–1.2)
BILIRUBIN URINE: NEGATIVE
BNP INTERPRETATION: ABNORMAL
BUN BLDV-MCNC: 15 MG/DL (ref 8–23)
BUN BLDV-MCNC: 16 MG/DL (ref 8–23)
BUN/CREAT BLD: ABNORMAL (ref 9–20)
BUN/CREAT BLD: ABNORMAL (ref 9–20)
CALCIUM SERPL-MCNC: 8.1 MG/DL (ref 8.6–10.4)
CALCIUM SERPL-MCNC: 8.9 MG/DL (ref 8.6–10.4)
CASTS UA: NORMAL /LPF (ref 0–2)
CHLORIDE BLD-SCNC: 105 MMOL/L (ref 98–107)
CHLORIDE BLD-SCNC: 109 MMOL/L (ref 98–107)
CK MB: 1.2 NG/ML
CKMB INTERPRETATION: NORMAL
CO2: 21 MMOL/L (ref 20–31)
CO2: 23 MMOL/L (ref 20–31)
COLOR: YELLOW
COMMENT UA: ABNORMAL
CREAT SERPL-MCNC: 0.76 MG/DL (ref 0.5–0.9)
CREAT SERPL-MCNC: 0.77 MG/DL (ref 0.5–0.9)
CRYSTALS, UA: NORMAL /HPF
EKG ATRIAL RATE: 394 BPM
EKG Q-T INTERVAL: 382 MS
EKG QRS DURATION: 114 MS
EKG QTC CALCULATION (BAZETT): 482 MS
EKG R AXIS: -32 DEGREES
EKG T AXIS: 50 DEGREES
EKG VENTRICULAR RATE: 96 BPM
EPITHELIAL CELLS UA: NORMAL /HPF (ref 0–5)
GFR AFRICAN AMERICAN: >60 ML/MIN
GFR AFRICAN AMERICAN: >60 ML/MIN
GFR NON-AFRICAN AMERICAN: 38 ML/MIN
GFR NON-AFRICAN AMERICAN: >60 ML/MIN
GFR NON-AFRICAN AMERICAN: >60 ML/MIN
GFR SERPL CREATININE-BSD FRML MDRD: 46 ML/MIN
GFR SERPL CREATININE-BSD FRML MDRD: ABNORMAL ML/MIN/{1.73_M2}
GLUCOSE BLD-MCNC: 102 MG/DL (ref 70–99)
GLUCOSE BLD-MCNC: 117 MG/DL (ref 65–105)
GLUCOSE BLD-MCNC: 127 MG/DL (ref 70–99)
GLUCOSE BLD-MCNC: 91 MG/DL (ref 65–105)
GLUCOSE BLD-MCNC: NORMAL MG/DL (ref 74–100)
GLUCOSE URINE: NEGATIVE
HCT VFR BLD CALC: 41.5 % (ref 36.3–47.1)
HEMOGLOBIN: 12 G/DL (ref 11.9–15.1)
KETONES, URINE: NEGATIVE
LEUKOCYTE ESTERASE, URINE: ABNORMAL
MAGNESIUM: 2.1 MG/DL (ref 1.6–2.6)
MCH RBC QN AUTO: 23.8 PG (ref 25.2–33.5)
MCHC RBC AUTO-ENTMCNC: 28.9 G/DL (ref 28.4–34.8)
MCV RBC AUTO: 82.2 FL (ref 82.6–102.9)
MUCUS: NORMAL
NITRITE, URINE: NEGATIVE
NRBC AUTOMATED: 0 PER 100 WBC
OTHER OBSERVATIONS UA: NORMAL
PDW BLD-RTO: 16.9 % (ref 11.8–14.4)
PH UA: 5.5 (ref 5–8)
PLATELET # BLD: 227 K/UL (ref 138–453)
PMV BLD AUTO: 9.7 FL (ref 8.1–13.5)
POC CHLORIDE: 112 MMOL/L (ref 98–107)
POC CREATININE: 1.41 MG/DL (ref 0.51–1.19)
POC HEMATOCRIT: 40 % (ref 36–46)
POC HEMOGLOBIN: 13.6 G/DL (ref 12–16)
POC INR: 1
POC POTASSIUM: 4 MMOL/L (ref 3.5–4.5)
POC SODIUM: 146 MMOL/L (ref 138–146)
POTASSIUM SERPL-SCNC: 4.2 MMOL/L (ref 3.7–5.3)
POTASSIUM SERPL-SCNC: 5.5 MMOL/L (ref 3.7–5.3)
PRO-BNP: 2750 PG/ML
PROTEIN UA: NEGATIVE
PROTHROMBIN TIME, POC: 11.7 SEC (ref 10.4–14.2)
RBC # BLD: 5.05 M/UL (ref 3.95–5.11)
RBC UA: NORMAL /HPF (ref 0–2)
REASON FOR REJECTION: NORMAL
REASON FOR REJECTION: NORMAL
RENAL EPITHELIAL, UA: NORMAL /HPF
SODIUM BLD-SCNC: 139 MMOL/L (ref 135–144)
SODIUM BLD-SCNC: 140 MMOL/L (ref 135–144)
SPECIFIC GRAVITY UA: 1.01 (ref 1–1.03)
TOTAL CK: 81 U/L (ref 26–192)
TRICHOMONAS: NORMAL
TROPONIN INTERP: ABNORMAL
TROPONIN INTERP: ABNORMAL
TROPONIN T: ABNORMAL NG/ML
TROPONIN T: ABNORMAL NG/ML
TROPONIN, HIGH SENSITIVITY: 44 NG/L (ref 0–14)
TROPONIN, HIGH SENSITIVITY: 45 NG/L (ref 0–14)
TURBIDITY: CLEAR
URINE HGB: NEGATIVE
UROBILINOGEN, URINE: NORMAL
WBC # BLD: 10.4 K/UL (ref 3.5–11.3)
WBC UA: NORMAL /HPF (ref 0–5)
YEAST: NORMAL
ZZ NTE CLEAN UP: ORDERED TEST: NORMAL
ZZ NTE CLEAN UP: ORDERED TEST: NORMAL
ZZ NTE WITH NAME CLEAN UP: SPECIMEN SOURCE: NORMAL
ZZ NTE WITH NAME CLEAN UP: SPECIMEN SOURCE: NORMAL

## 2021-04-06 PROCEDURE — 2780000006 HC MISC HEART VALVE

## 2021-04-06 PROCEDURE — 2000000000 HC ICU R&B

## 2021-04-06 PROCEDURE — 2580000003 HC RX 258: Performed by: NURSE ANESTHETIST, CERTIFIED REGISTERED

## 2021-04-06 PROCEDURE — 86850 RBC ANTIBODY SCREEN: CPT

## 2021-04-06 PROCEDURE — 82435 ASSAY OF BLOOD CHLORIDE: CPT

## 2021-04-06 PROCEDURE — 2720000010 HC SURG SUPPLY STERILE

## 2021-04-06 PROCEDURE — 82247 BILIRUBIN TOTAL: CPT

## 2021-04-06 PROCEDURE — 85610 PROTHROMBIN TIME: CPT

## 2021-04-06 PROCEDURE — 85347 COAGULATION TIME ACTIVATED: CPT

## 2021-04-06 PROCEDURE — 3700000000 HC ANESTHESIA ATTENDED CARE

## 2021-04-06 PROCEDURE — 6360000002 HC RX W HCPCS: Performed by: NURSE PRACTITIONER

## 2021-04-06 PROCEDURE — 85027 COMPLETE CBC AUTOMATED: CPT

## 2021-04-06 PROCEDURE — 7100000000 HC PACU RECOVERY - FIRST 15 MIN

## 2021-04-06 PROCEDURE — 87186 SC STD MICRODIL/AGAR DIL: CPT

## 2021-04-06 PROCEDURE — 84484 ASSAY OF TROPONIN QUANT: CPT

## 2021-04-06 PROCEDURE — 82565 ASSAY OF CREATININE: CPT

## 2021-04-06 PROCEDURE — 2580000003 HC RX 258: Performed by: NURSE PRACTITIONER

## 2021-04-06 PROCEDURE — 36415 COLL VENOUS BLD VENIPUNCTURE: CPT

## 2021-04-06 PROCEDURE — 7100000001 HC PACU RECOVERY - ADDTL 15 MIN

## 2021-04-06 PROCEDURE — 83735 ASSAY OF MAGNESIUM: CPT

## 2021-04-06 PROCEDURE — 83880 ASSAY OF NATRIURETIC PEPTIDE: CPT

## 2021-04-06 PROCEDURE — C1769 GUIDE WIRE: HCPCS

## 2021-04-06 PROCEDURE — 87077 CULTURE AEROBIC IDENTIFY: CPT

## 2021-04-06 PROCEDURE — 84295 ASSAY OF SERUM SODIUM: CPT

## 2021-04-06 PROCEDURE — B3101ZZ FLUOROSCOPY OF THORACIC AORTA USING LOW OSMOLAR CONTRAST: ICD-10-PCS | Performed by: INTERNAL MEDICINE

## 2021-04-06 PROCEDURE — 82550 ASSAY OF CK (CPK): CPT

## 2021-04-06 PROCEDURE — 86900 BLOOD TYPING SEROLOGIC ABO: CPT

## 2021-04-06 PROCEDURE — 82040 ASSAY OF SERUM ALBUMIN: CPT

## 2021-04-06 PROCEDURE — 86901 BLOOD TYPING SEROLOGIC RH(D): CPT

## 2021-04-06 PROCEDURE — 82553 CREATINE MB FRACTION: CPT

## 2021-04-06 PROCEDURE — 85014 HEMATOCRIT: CPT

## 2021-04-06 PROCEDURE — C1894 INTRO/SHEATH, NON-LASER: HCPCS

## 2021-04-06 PROCEDURE — 2709999900 HC NON-CHARGEABLE SUPPLY

## 2021-04-06 PROCEDURE — 6370000000 HC RX 637 (ALT 250 FOR IP): Performed by: INTERNAL MEDICINE

## 2021-04-06 PROCEDURE — 6360000002 HC RX W HCPCS: Performed by: INTERNAL MEDICINE

## 2021-04-06 PROCEDURE — 93005 ELECTROCARDIOGRAM TRACING: CPT | Performed by: NURSE PRACTITIONER

## 2021-04-06 PROCEDURE — 33361 REPLACE AORTIC VALVE PERQ: CPT | Performed by: INTERNAL MEDICINE

## 2021-04-06 PROCEDURE — 6370000000 HC RX 637 (ALT 250 FOR IP)

## 2021-04-06 PROCEDURE — C1760 CLOSURE DEV, VASC: HCPCS

## 2021-04-06 PROCEDURE — 2500000003 HC RX 250 WO HCPCS: Performed by: ANESTHESIOLOGY

## 2021-04-06 PROCEDURE — 2500000003 HC RX 250 WO HCPCS: Performed by: NURSE ANESTHETIST, CERTIFIED REGISTERED

## 2021-04-06 PROCEDURE — 87086 URINE CULTURE/COLONY COUNT: CPT

## 2021-04-06 PROCEDURE — 81001 URINALYSIS AUTO W/SCOPE: CPT

## 2021-04-06 PROCEDURE — 82947 ASSAY GLUCOSE BLOOD QUANT: CPT

## 2021-04-06 PROCEDURE — 86920 COMPATIBILITY TEST SPIN: CPT

## 2021-04-06 PROCEDURE — 80048 BASIC METABOLIC PNL TOTAL CA: CPT

## 2021-04-06 PROCEDURE — 6360000004 HC RX CONTRAST MEDICATION

## 2021-04-06 PROCEDURE — 71045 X-RAY EXAM CHEST 1 VIEW: CPT

## 2021-04-06 PROCEDURE — 6360000002 HC RX W HCPCS: Performed by: NURSE ANESTHETIST, CERTIFIED REGISTERED

## 2021-04-06 PROCEDURE — 33361 REPLACE AORTIC VALVE PERQ: CPT | Performed by: THORACIC SURGERY (CARDIOTHORACIC VASCULAR SURGERY)

## 2021-04-06 PROCEDURE — 3700000001 HC ADD 15 MINUTES (ANESTHESIA)

## 2021-04-06 PROCEDURE — 2500000003 HC RX 250 WO HCPCS

## 2021-04-06 PROCEDURE — 02RF38Z REPLACEMENT OF AORTIC VALVE WITH ZOOPLASTIC TISSUE, PERCUTANEOUS APPROACH: ICD-10-PCS | Performed by: INTERNAL MEDICINE

## 2021-04-06 PROCEDURE — 84132 ASSAY OF SERUM POTASSIUM: CPT

## 2021-04-06 PROCEDURE — C1725 CATH, TRANSLUMIN NON-LASER: HCPCS

## 2021-04-06 PROCEDURE — 6360000002 HC RX W HCPCS

## 2021-04-06 PROCEDURE — 2580000003 HC RX 258: Performed by: INTERNAL MEDICINE

## 2021-04-06 RX ORDER — BUPROPION HYDROCHLORIDE 150 MG/1
150 TABLET ORAL DAILY
Status: DISCONTINUED | OUTPATIENT
Start: 2021-04-06 | End: 2021-04-10 | Stop reason: HOSPADM

## 2021-04-06 RX ORDER — PANTOPRAZOLE SODIUM 40 MG/1
40 TABLET, DELAYED RELEASE ORAL
Status: DISCONTINUED | OUTPATIENT
Start: 2021-04-06 | End: 2021-04-10 | Stop reason: HOSPADM

## 2021-04-06 RX ORDER — BUPIVACAINE HYDROCHLORIDE 7.5 MG/ML
INJECTION, SOLUTION INTRASPINAL
Status: DISPENSED
Start: 2021-04-06 | End: 2021-04-06

## 2021-04-06 RX ORDER — MIDAZOLAM HYDROCHLORIDE 1 MG/ML
INJECTION INTRAMUSCULAR; INTRAVENOUS PRN
Status: DISCONTINUED | OUTPATIENT
Start: 2021-04-06 | End: 2021-04-06 | Stop reason: SDUPTHER

## 2021-04-06 RX ORDER — GLYCOPYRROLATE 1 MG/5 ML
SYRINGE (ML) INTRAVENOUS PRN
Status: DISCONTINUED | OUTPATIENT
Start: 2021-04-06 | End: 2021-04-06 | Stop reason: SDUPTHER

## 2021-04-06 RX ORDER — HEPARIN SODIUM 1000 [USP'U]/ML
INJECTION, SOLUTION INTRAVENOUS; SUBCUTANEOUS PRN
Status: DISCONTINUED | OUTPATIENT
Start: 2021-04-06 | End: 2021-04-06 | Stop reason: SDUPTHER

## 2021-04-06 RX ORDER — PHENYLEPHRINE HYDROCHLORIDE 10 MG/ML
INJECTION INTRAVENOUS PRN
Status: DISCONTINUED | OUTPATIENT
Start: 2021-04-06 | End: 2021-04-06 | Stop reason: SDUPTHER

## 2021-04-06 RX ORDER — ACETAMINOPHEN 325 MG/1
650 TABLET ORAL EVERY 4 HOURS PRN
Status: DISCONTINUED | OUTPATIENT
Start: 2021-04-06 | End: 2021-04-10 | Stop reason: HOSPADM

## 2021-04-06 RX ORDER — SODIUM CHLORIDE, SODIUM LACTATE, POTASSIUM CHLORIDE, CALCIUM CHLORIDE 600; 310; 30; 20 MG/100ML; MG/100ML; MG/100ML; MG/100ML
INJECTION, SOLUTION INTRAVENOUS CONTINUOUS PRN
Status: DISCONTINUED | OUTPATIENT
Start: 2021-04-06 | End: 2021-04-06 | Stop reason: SDUPTHER

## 2021-04-06 RX ORDER — ATROPINE SULFATE 0.4 MG/ML
0.4 AMPUL (ML) INJECTION
Status: DISPENSED | OUTPATIENT
Start: 2021-04-06 | End: 2021-04-06

## 2021-04-06 RX ORDER — LEVOTHYROXINE SODIUM 0.12 MG/1
125 TABLET ORAL DAILY
Status: DISCONTINUED | OUTPATIENT
Start: 2021-04-07 | End: 2021-04-10 | Stop reason: HOSPADM

## 2021-04-06 RX ORDER — DEXMEDETOMIDINE HYDROCHLORIDE 4 UG/ML
.2-1.4 INJECTION, SOLUTION INTRAVENOUS ONCE
Status: COMPLETED | OUTPATIENT
Start: 2021-04-06 | End: 2021-04-06

## 2021-04-06 RX ORDER — SODIUM CHLORIDE 0.9 % (FLUSH) 0.9 %
10 SYRINGE (ML) INJECTION EVERY 12 HOURS SCHEDULED
Status: DISCONTINUED | OUTPATIENT
Start: 2021-04-06 | End: 2021-04-10 | Stop reason: HOSPADM

## 2021-04-06 RX ORDER — CLOPIDOGREL BISULFATE 75 MG/1
75 TABLET ORAL DAILY
Status: DISCONTINUED | OUTPATIENT
Start: 2021-04-07 | End: 2021-04-10 | Stop reason: HOSPADM

## 2021-04-06 RX ORDER — BUPIVACAINE HYDROCHLORIDE 5 MG/ML
INJECTION, SOLUTION EPIDURAL; INTRACAUDAL
Status: DISPENSED
Start: 2021-04-06 | End: 2021-04-06

## 2021-04-06 RX ORDER — SODIUM CHLORIDE 9 MG/ML
INJECTION, SOLUTION INTRAVENOUS CONTINUOUS
Status: DISCONTINUED | OUTPATIENT
Start: 2021-04-06 | End: 2021-04-10 | Stop reason: HOSPADM

## 2021-04-06 RX ORDER — SODIUM CHLORIDE 0.9 % (FLUSH) 0.9 %
10 SYRINGE (ML) INJECTION PRN
Status: DISCONTINUED | OUTPATIENT
Start: 2021-04-06 | End: 2021-04-10 | Stop reason: HOSPADM

## 2021-04-06 RX ORDER — VANCOMYCIN HYDROCHLORIDE 1 G/200ML
1000 INJECTION, SOLUTION INTRAVENOUS EVERY 12 HOURS
Status: DISCONTINUED | OUTPATIENT
Start: 2021-04-06 | End: 2021-04-06

## 2021-04-06 RX ORDER — PROTAMINE SULFATE 10 MG/ML
INJECTION, SOLUTION INTRAVENOUS PRN
Status: DISCONTINUED | OUTPATIENT
Start: 2021-04-06 | End: 2021-04-06 | Stop reason: SDUPTHER

## 2021-04-06 RX ORDER — MORPHINE SULFATE 4 MG/ML
4 INJECTION, SOLUTION INTRAMUSCULAR; INTRAVENOUS
Status: DISCONTINUED | OUTPATIENT
Start: 2021-04-06 | End: 2021-04-10 | Stop reason: HOSPADM

## 2021-04-06 RX ORDER — PROPOFOL 10 MG/ML
INJECTION, EMULSION INTRAVENOUS PRN
Status: DISCONTINUED | OUTPATIENT
Start: 2021-04-06 | End: 2021-04-06 | Stop reason: SDUPTHER

## 2021-04-06 RX ORDER — DEXAMETHASONE SODIUM PHOSPHATE 10 MG/ML
INJECTION INTRAMUSCULAR; INTRAVENOUS PRN
Status: DISCONTINUED | OUTPATIENT
Start: 2021-04-06 | End: 2021-04-06 | Stop reason: SDUPTHER

## 2021-04-06 RX ORDER — PROPOFOL 10 MG/ML
INJECTION, EMULSION INTRAVENOUS CONTINUOUS PRN
Status: DISCONTINUED | OUTPATIENT
Start: 2021-04-06 | End: 2021-04-06 | Stop reason: SDUPTHER

## 2021-04-06 RX ORDER — LIDOCAINE HYDROCHLORIDE 10 MG/ML
INJECTION, SOLUTION INFILTRATION; PERINEURAL PRN
Status: DISCONTINUED | OUTPATIENT
Start: 2021-04-06 | End: 2021-04-06 | Stop reason: SDUPTHER

## 2021-04-06 RX ORDER — NEOSTIGMINE METHYLSULFATE 5 MG/5 ML
SYRINGE (ML) INTRAVENOUS PRN
Status: DISCONTINUED | OUTPATIENT
Start: 2021-04-06 | End: 2021-04-06 | Stop reason: SDUPTHER

## 2021-04-06 RX ORDER — SODIUM CHLORIDE 9 MG/ML
INJECTION, SOLUTION INTRAVENOUS CONTINUOUS
Status: DISCONTINUED | OUTPATIENT
Start: 2021-04-06 | End: 2021-04-06

## 2021-04-06 RX ORDER — ONDANSETRON 2 MG/ML
INJECTION INTRAMUSCULAR; INTRAVENOUS PRN
Status: DISCONTINUED | OUTPATIENT
Start: 2021-04-06 | End: 2021-04-06 | Stop reason: SDUPTHER

## 2021-04-06 RX ORDER — ONDANSETRON 2 MG/ML
4 INJECTION INTRAMUSCULAR; INTRAVENOUS EVERY 6 HOURS PRN
Status: DISCONTINUED | OUTPATIENT
Start: 2021-04-06 | End: 2021-04-10 | Stop reason: HOSPADM

## 2021-04-06 RX ORDER — SODIUM CHLORIDE 9 MG/ML
INJECTION, SOLUTION INTRAVENOUS PRN
Status: DISCONTINUED | OUTPATIENT
Start: 2021-04-06 | End: 2021-04-10 | Stop reason: HOSPADM

## 2021-04-06 RX ORDER — CLOPIDOGREL 300 MG/1
300 TABLET, FILM COATED ORAL ONCE
Status: COMPLETED | OUTPATIENT
Start: 2021-04-06 | End: 2021-04-06

## 2021-04-06 RX ORDER — FENTANYL CITRATE 50 UG/ML
INJECTION, SOLUTION INTRAMUSCULAR; INTRAVENOUS PRN
Status: DISCONTINUED | OUTPATIENT
Start: 2021-04-06 | End: 2021-04-06 | Stop reason: SDUPTHER

## 2021-04-06 RX ORDER — MORPHINE SULFATE 2 MG/ML
2 INJECTION, SOLUTION INTRAMUSCULAR; INTRAVENOUS
Status: DISCONTINUED | OUTPATIENT
Start: 2021-04-06 | End: 2021-04-10 | Stop reason: HOSPADM

## 2021-04-06 RX ORDER — ROCURONIUM BROMIDE 10 MG/ML
INJECTION, SOLUTION INTRAVENOUS PRN
Status: DISCONTINUED | OUTPATIENT
Start: 2021-04-06 | End: 2021-04-06 | Stop reason: SDUPTHER

## 2021-04-06 RX ORDER — SODIUM CHLORIDE 9 MG/ML
INJECTION INTRAVENOUS PRN
Status: DISCONTINUED | OUTPATIENT
Start: 2021-04-06 | End: 2021-04-06 | Stop reason: SDUPTHER

## 2021-04-06 RX ORDER — LISINOPRIL 20 MG/1
20 TABLET ORAL DAILY
Status: DISCONTINUED | OUTPATIENT
Start: 2021-04-07 | End: 2021-04-10 | Stop reason: HOSPADM

## 2021-04-06 RX ORDER — FUROSEMIDE 20 MG/1
20 TABLET ORAL DAILY
Status: DISCONTINUED | OUTPATIENT
Start: 2021-04-06 | End: 2021-04-10 | Stop reason: HOSPADM

## 2021-04-06 RX ORDER — SODIUM CHLORIDE 9 MG/ML
INJECTION, SOLUTION INTRAVENOUS CONTINUOUS PRN
Status: DISCONTINUED | OUTPATIENT
Start: 2021-04-06 | End: 2021-04-06 | Stop reason: SDUPTHER

## 2021-04-06 RX ORDER — ASPIRIN 81 MG/1
81 TABLET ORAL DAILY
Status: DISCONTINUED | OUTPATIENT
Start: 2021-04-07 | End: 2021-04-10 | Stop reason: HOSPADM

## 2021-04-06 RX ADMIN — PHENYLEPHRINE HYDROCHLORIDE 50 MCG: 10 INJECTION INTRAVENOUS at 08:36

## 2021-04-06 RX ADMIN — PHENYLEPHRINE HYDROCHLORIDE 200 MCG: 10 INJECTION INTRAVENOUS at 08:50

## 2021-04-06 RX ADMIN — DEXMEDETOMIDINE HYDROCHLORIDE 1 MCG/KG/HR: 400 INJECTION INTRAVENOUS at 07:38

## 2021-04-06 RX ADMIN — DEXAMETHASONE SODIUM PHOSPHATE 4 MG: 10 INJECTION INTRAMUSCULAR; INTRAVENOUS at 09:36

## 2021-04-06 RX ADMIN — ROCURONIUM BROMIDE 40 MG: 10 INJECTION INTRAVENOUS at 08:20

## 2021-04-06 RX ADMIN — HEPARIN SODIUM 18000 UNITS: 1000 INJECTION, SOLUTION INTRAVENOUS; SUBCUTANEOUS at 08:01

## 2021-04-06 RX ADMIN — SODIUM CHLORIDE, POTASSIUM CHLORIDE, SODIUM LACTATE AND CALCIUM CHLORIDE: 600; 310; 30; 20 INJECTION, SOLUTION INTRAVENOUS at 07:38

## 2021-04-06 RX ADMIN — SODIUM CHLORIDE: 9 INJECTION, SOLUTION INTRAVENOUS at 10:05

## 2021-04-06 RX ADMIN — MORPHINE SULFATE 4 MG: 4 INJECTION INTRAVENOUS at 10:52

## 2021-04-06 RX ADMIN — APIXABAN 5 MG: 5 TABLET, FILM COATED ORAL at 20:01

## 2021-04-06 RX ADMIN — FENTANYL CITRATE 50 MCG: 50 INJECTION, SOLUTION INTRAMUSCULAR; INTRAVENOUS at 09:53

## 2021-04-06 RX ADMIN — MIDAZOLAM HYDROCHLORIDE 2 MG: 1 INJECTION, SOLUTION INTRAMUSCULAR; INTRAVENOUS at 08:16

## 2021-04-06 RX ADMIN — APIXABAN 5 MG: 5 TABLET, FILM COATED ORAL at 12:37

## 2021-04-06 RX ADMIN — SODIUM CHLORIDE, POTASSIUM CHLORIDE, SODIUM LACTATE AND CALCIUM CHLORIDE: 600; 310; 30; 20 INJECTION, SOLUTION INTRAVENOUS at 09:25

## 2021-04-06 RX ADMIN — PROPOFOL 75 MCG/KG/MIN: 10 INJECTION, EMULSION INTRAVENOUS at 08:08

## 2021-04-06 RX ADMIN — PROTAMINE SULFATE 30 MG: 10 INJECTION, SOLUTION INTRAVENOUS at 09:19

## 2021-04-06 RX ADMIN — MIDAZOLAM HYDROCHLORIDE 2 MG: 1 INJECTION, SOLUTION INTRAMUSCULAR; INTRAVENOUS at 08:05

## 2021-04-06 RX ADMIN — SODIUM CHLORIDE: 9 INJECTION, SOLUTION INTRAVENOUS at 07:04

## 2021-04-06 RX ADMIN — PHENYLEPHRINE HYDROCHLORIDE 200 MCG: 10 INJECTION INTRAVENOUS at 09:08

## 2021-04-06 RX ADMIN — LIDOCAINE HYDROCHLORIDE 50 MG: 10 INJECTION, SOLUTION INFILTRATION; PERINEURAL at 08:20

## 2021-04-06 RX ADMIN — FENTANYL CITRATE 50 MCG: 50 INJECTION, SOLUTION INTRAMUSCULAR; INTRAVENOUS at 07:38

## 2021-04-06 RX ADMIN — BUPROPION HYDROCHLORIDE 150 MG: 150 TABLET, EXTENDED RELEASE ORAL at 12:37

## 2021-04-06 RX ADMIN — PROPOFOL 100 MG: 10 INJECTION, EMULSION INTRAVENOUS at 08:20

## 2021-04-06 RX ADMIN — Medication 1500 MG: at 08:01

## 2021-04-06 RX ADMIN — CLOPIDOGREL 300 MG: 300 TABLET, FILM COATED ORAL at 06:32

## 2021-04-06 RX ADMIN — MORPHINE SULFATE 4 MG: 4 INJECTION INTRAVENOUS at 22:38

## 2021-04-06 RX ADMIN — Medication 3 MG: at 09:45

## 2021-04-06 RX ADMIN — PROPOFOL 30 MG: 10 INJECTION, EMULSION INTRAVENOUS at 08:08

## 2021-04-06 RX ADMIN — MORPHINE SULFATE 2 MG: 2 INJECTION, SOLUTION INTRAMUSCULAR; INTRAVENOUS at 17:59

## 2021-04-06 RX ADMIN — SODIUM CHLORIDE: 900 INJECTION INTRAVENOUS at 07:30

## 2021-04-06 RX ADMIN — SODIUM CHLORIDE, POTASSIUM CHLORIDE, SODIUM LACTATE AND CALCIUM CHLORIDE: 600; 310; 30; 20 INJECTION, SOLUTION INTRAVENOUS at 08:26

## 2021-04-06 RX ADMIN — PHENYLEPHRINE HYDROCHLORIDE 50 MCG/MIN: 10 INJECTION INTRAVENOUS at 08:56

## 2021-04-06 RX ADMIN — PHENYLEPHRINE HYDROCHLORIDE 200 MCG: 10 INJECTION INTRAVENOUS at 09:09

## 2021-04-06 RX ADMIN — MORPHINE SULFATE 2 MG: 2 INJECTION, SOLUTION INTRAMUSCULAR; INTRAVENOUS at 20:01

## 2021-04-06 RX ADMIN — Medication 0.6 MG: at 09:45

## 2021-04-06 RX ADMIN — PANTOPRAZOLE SODIUM 40 MG: 40 TABLET, DELAYED RELEASE ORAL at 16:38

## 2021-04-06 RX ADMIN — SODIUM CHLORIDE 10 ML: 9 INJECTION INTRAMUSCULAR; INTRAVENOUS; SUBCUTANEOUS at 09:24

## 2021-04-06 RX ADMIN — Medication 1500 MG: at 20:37

## 2021-04-06 RX ADMIN — MIDAZOLAM HYDROCHLORIDE 2 MG: 1 INJECTION, SOLUTION INTRAMUSCULAR; INTRAVENOUS at 07:38

## 2021-04-06 RX ADMIN — PHENYLEPHRINE HYDROCHLORIDE 50 MCG: 10 INJECTION INTRAVENOUS at 08:41

## 2021-04-06 RX ADMIN — ONDANSETRON 4 MG: 2 INJECTION INTRAMUSCULAR; INTRAVENOUS at 09:36

## 2021-04-06 ASSESSMENT — PULMONARY FUNCTION TESTS
PIF_VALUE: 0
PIF_VALUE: 31
PIF_VALUE: 29
PIF_VALUE: 29
PIF_VALUE: 33
PIF_VALUE: 29
PIF_VALUE: 35
PIF_VALUE: 31
PIF_VALUE: 16
PIF_VALUE: 0
PIF_VALUE: 30
PIF_VALUE: 31
PIF_VALUE: 0
PIF_VALUE: 29
PIF_VALUE: 30
PIF_VALUE: 30
PIF_VALUE: 0
PIF_VALUE: 30
PIF_VALUE: 31
PIF_VALUE: 0
PIF_VALUE: 0
PIF_VALUE: 28
PIF_VALUE: 0
PIF_VALUE: 30
PIF_VALUE: 30
PIF_VALUE: 0
PIF_VALUE: 5
PIF_VALUE: 30
PIF_VALUE: 31
PIF_VALUE: 31
PIF_VALUE: 15
PIF_VALUE: 0
PIF_VALUE: 31
PIF_VALUE: 1
PIF_VALUE: 1
PIF_VALUE: 0
PIF_VALUE: 28
PIF_VALUE: 0
PIF_VALUE: 30
PIF_VALUE: 0
PIF_VALUE: 0
PIF_VALUE: 30
PIF_VALUE: 0
PIF_VALUE: 30
PIF_VALUE: 28
PIF_VALUE: 15
PIF_VALUE: 30
PIF_VALUE: 0
PIF_VALUE: 31
PIF_VALUE: 0
PIF_VALUE: 28
PIF_VALUE: 30
PIF_VALUE: 28
PIF_VALUE: 30
PIF_VALUE: 29
PIF_VALUE: 30
PIF_VALUE: 0
PIF_VALUE: 0
PIF_VALUE: 29
PIF_VALUE: 0
PIF_VALUE: 29
PIF_VALUE: 31
PIF_VALUE: 30
PIF_VALUE: 0
PIF_VALUE: 0
PIF_VALUE: 29
PIF_VALUE: 0
PIF_VALUE: 29
PIF_VALUE: 30
PIF_VALUE: 0
PIF_VALUE: 31

## 2021-04-06 ASSESSMENT — PAIN SCALES - GENERAL
PAINLEVEL_OUTOF10: 7
PAINLEVEL_OUTOF10: 6
PAINLEVEL_OUTOF10: 8
PAINLEVEL_OUTOF10: 8
PAINLEVEL_OUTOF10: 4
PAINLEVEL_OUTOF10: 8

## 2021-04-06 ASSESSMENT — PAIN DESCRIPTION - DESCRIPTORS
DESCRIPTORS: CONSTANT;THROBBING
DESCRIPTORS: CONSTANT;THROBBING

## 2021-04-06 ASSESSMENT — PAIN DESCRIPTION - LOCATION: LOCATION: BACK;KNEE

## 2021-04-06 ASSESSMENT — PAIN DESCRIPTION - PAIN TYPE
TYPE: CHRONIC PAIN
TYPE: CHRONIC PAIN

## 2021-04-06 ASSESSMENT — PAIN DESCRIPTION - ONSET
ONSET: ON-GOING
ONSET: ON-GOING

## 2021-04-06 ASSESSMENT — PAIN DESCRIPTION - FREQUENCY: FREQUENCY: CONTINUOUS

## 2021-04-06 NOTE — FLOWSHEET NOTE
04/06/21 8748   Encounter Summary   Services provided to: Patient   Referral/Consult From: Rounding   Continue Visiting   (04/06/2021)   Complexity of Encounter Low   Length of Encounter 15 minutes   Spiritual Assessment Completed Yes   Routine   Type Initial   Assessment Calm; Approachable;Passive   Intervention Active listening;Sustaining presence/ Ministry of presence   Outcome Expressed gratitude;Comfort

## 2021-04-06 NOTE — ANESTHESIA POSTPROCEDURE EVALUATION
Department of Anesthesiology  Postprocedure Note    Patient: Niya Boyce  MRN: [id number]  YOB: 1960  Date of evaluation: 4/6/2021  Time:  1:07 PM     Procedure Summary     Date: 04/06/21 Room / Location: [facility] Cath Lab    Anesthesia Start: 0730 Anesthesia Stop: 1005    Procedure: TAVR W/ ANESTHESIA Diagnosis:       Aortic stenosis      S/P TAVR (transcatheter aortic valve replacement)      S/P TAVR (transcatheter aortic valve replacement)    Scheduled Providers:  Responsible Provider: Alanis Dasilva MD    Anesthesia Type: MAC ASA Status: 4          Anesthesia Type: MAC    Sera Phase I: Sera Score: 9    Sera Phase II:      Last vitals: Reviewed and per EMR flowsheets.        Anesthesia Post Evaluation    Patient location during evaluation: ICU  Patient participation: complete - patient participated  Level of consciousness: awake and alert  Pain score: 0  Airway patency: patent  Nausea & Vomiting: no vomiting and no nausea  Complications: no  Cardiovascular status: hemodynamically stable  Respiratory status: acceptable  Hydration status: stable

## 2021-04-06 NOTE — ANESTHESIA PRE PROCEDURE
Department of Anesthesiology  Preprocedure Note       Name:  Morteza Pack   Age:  57 y.o.  :  1960                                          MRN:  [id number]         Date:  2021      Surgeon: * Surgery not found *    Procedure:     Medications prior to admission:   Prior to Admission medications    Medication Sig Start Date End Date Taking?  Authorizing Provider   spironolactone (ALDACTONE) 25 MG tablet Take 25 mg by mouth daily   Yes Historical Provider, MD   lisinopril (PRINIVIL;ZESTRIL) 20 MG tablet Take 20 mg by mouth daily   Yes Historical Provider, MD   buPROPion (WELLBUTRIN XL) 150 MG extended release tablet Take 150 mg by mouth daily 20 Yes Historical Provider, MD   levothyroxine (SYNTHROID) 50 MCG tablet  3/6/21  Yes Historical Provider, MD   omeprazole (PRILOSEC) 40 MG delayed release capsule Take 40 mg by mouth daily 20 Yes Historical Provider, MD   apixaban (ELIQUIS) 5 MG TABS tablet Take 5 mg by mouth 2 times daily   Yes Historical Provider, MD   furosemide (LASIX) 20 MG tablet Take 20 mg by mouth daily   Yes Historical Provider, MD   metoprolol tartrate (LOPRESSOR) 25 MG tablet Take 25 mg by mouth 2 times daily   Yes Historical Provider, MD   minocycline (MINOCIN;DYNACIN) 50 MG capsule Take 50 mg by mouth 2 times daily 12/3/20 6/1/21  Historical Provider, MD       Current medications:    Current Outpatient Medications   Medication Sig Dispense Refill    spironolactone (ALDACTONE) 25 MG tablet Take 25 mg by mouth daily      lisinopril (PRINIVIL;ZESTRIL) 20 MG tablet Take 20 mg by mouth daily      buPROPion (WELLBUTRIN XL) 150 MG extended release tablet Take 150 mg by mouth daily      levothyroxine (SYNTHROID) 50 MCG tablet       omeprazole (PRILOSEC) 40 MG delayed release capsule Take 40 mg by mouth daily      apixaban (ELIQUIS) 5 MG TABS tablet Take 5 mg by mouth 2 times daily      furosemide (LASIX) 20 MG tablet Take 20 mg by mouth daily      metoprolol tartrate CHOLECYSTECTOMY      UPPER GASTROINTESTINAL ENDOSCOPY N/A 1/20/2019    EGD DIAGNOSTIC ONLY performed by Celestina Munoz MD at [facility] Endoscopy       Social History:    Social History     Tobacco Use    Smoking status: Never Smoker    Smokeless tobacco: Never Used   Substance Use Topics    Alcohol use: No                                Counseling given: Not Answered      Vital Signs (Current):   Vitals:    04/06/21 0625   BP: (!) 157/99   Pulse: 98   Resp: 16   Temp: 97.8 °F (36.6 °C)   TempSrc: Oral   SpO2: 98%   Weight: 300 lb (136.1 kg)   Height: 5' 8\" (1.727 m)                                              BP Readings from Last 3 Encounters:   04/06/21 (!) 157/99   03/10/21 109/80   02/22/21 (!) 140/90       NPO Status: Time of last liquid consumption: 0630                        Time of last solid consumption: 2200                        Date of last liquid consumption: 04/06/21                        Date of last solid food consumption: 04/05/21    BMI:   Wt Readings from Last 3 Encounters:   04/06/21 300 lb (136.1 kg)   03/10/21 (!) 322 lb (146.1 kg)   02/22/21 300 lb (136.1 kg)     Body mass index is 45.61 kg/m². CBC:   Lab Results   Component Value Date     02/22/2021       CMP:   Lab Results   Component Value Date     03/30/2021    K 4.1 03/30/2021     03/30/2021    CO2 25 03/30/2021    BUN 14 03/30/2021    CREATININE 1.07 03/30/2021    LABGLOM 55 03/30/2021    LABGLOM 49 03/25/2021    GLUCOSE 97 03/30/2021    CALCIUM 9.0 03/30/2021       POC Tests: No results for input(s): POCGLU, POCNA, POCK, POCCL, POCBUN, POCHEMO, POCHCT in the last 72 hours.     Coags: No results found for: PROTIME, INR, APTT    HCG (If Applicable): No results found for: PREGTESTUR, PREGSERUM, HCG, HCGQUANT     ABGs: No results found for: PHART, PO2ART, INK1GWT, RDQ8OSB, BEART, [id number]     Type & Screen (If Applicable):  No results found for: LABABO, LABRH    Drug/Infectious Status (If Applicable):  No results found for: HIV, HEPCAB    COVID-19 Screening (If Applicable):   Lab Results   Component Value Date    COVID19 Not Detected 04/01/2021           Anesthesia Evaluation  Patient summary reviewed no history of anesthetic complications:   Airway: Mallampati: III  TM distance: >3 FB   Neck ROM: full  Mouth opening: > = 3 FB Dental:    (+) lower dentures      Pulmonary:normal exam    (+) sleep apnea: on CPAP,                             Cardiovascular:    (+) hypertension: no interval change, valvular problems/murmurs: AS and MR, CHF: no interval change, murmur,       ECG reviewed  Rhythm: regular    Echocardiogram reviewed                  Neuro/Psych:   (+) psychiatric history:            GI/Hepatic/Renal:   (+) GERD: no interval change, morbid obesity          Endo/Other:    (+) hypothyroidism::., .                 Abdominal:   (+) obese,         Vascular: negative vascular ROS. Anesthesia Plan      MAC     ASA 4       Induction: intravenous. Anesthetic plan and risks discussed with patient. Use of blood products discussed with patient whom consented to blood products. Plan discussed with CRNA.                   Lennox Wall MD   4/6/2021

## 2021-04-06 NOTE — PROGRESS NOTES
Pt arrived to unit accompanied by cath lab staff. Patient attached to monitor. Pt requiring pacemaker & placed on unit pacer box. Report received from CRNA.

## 2021-04-06 NOTE — H&P
TAVR Consult & Documentation  Summary of workup                Today's Date: 4/6/2021  Patient Name: Basia Ozuna  Date of admission: 4/6/2021  6:06 AM  Patient's age: 57 y. o., 1960  Admission Dx: Aortic stenosis [I35.0]    Reason for Consult:  Cardiac evaluation    Requesting Physician: No admitting provider for patient encounter. CHIEF COMPLAINT:  SOB, CHF, LE edema    History Obtained From:  patient    HISTORY OF PRESENT ILLNESS:      The patient is a 57 y.o.  female who is admitted to the hospital for TAVR    Patient is known now with SOB with minimal exertion, with recurrent CHF and LE edema. She gained many pounds due to CHF and fluid overload despite trx with dieretics. She is known with multiple co - morbid factors,     Patient Active Problem List   Diagnosis    Chronic pulmonary heart disease ([address]    Essential hypertension    Aortic stenosis, severe    CHF NYHA class III, chronic, combined ([address]    Depression    Diverticulitis of colon    Esophageal reflux    Hypothyroidism    Insomnia    Iron deficiency anemia    Mixed stress and urge urinary incontinence    Obstructive Sleep apnea    Atrial fibrillation (HCC) - on anticoagulation    Rosacea    Moderate mitral regurgitation - most likely secondary to aortic stenosis    Morbid obesity with BMI of 45.0-49.9, adult ([address]       She was evaluated by structural heart team and agreed that she is a high risk for surgical intervention and TAVR is a better option. Past Medical History:   has a past medical history of Anxiety, Aortic stenosis, severe, Atrial fibrillation ([address] CHF NYHA class III, chronic, combined ([address] Depression, Hyperlipidemia, Hypertension, Hypothyroidism, Moderate mitral regurgitation, Morbid obesity with BMI of 45.0-49.9, adult ([address] Obesity, Sleep apnea, and Thyroid disease. Past Surgical History:   has a past surgical history that includes Cholecystectomy;  Upper gastrointestinal endoscopy (N/A, 1/20/2019); and Cardiac catheterization (02/22/2021). Home Medications:    Prior to Admission medications    Medication Sig Start Date End Date Taking? Authorizing Provider   spironolactone (ALDACTONE) 25 MG tablet Take 25 mg by mouth daily   Yes Historical Provider, MD   lisinopril (PRINIVIL;ZESTRIL) 20 MG tablet Take 20 mg by mouth daily   Yes Historical Provider, MD   buPROPion (WELLBUTRIN XL) 150 MG extended release tablet Take 150 mg by mouth daily 7/20/20 7/20/21 Yes Historical Provider, MD   levothyroxine (SYNTHROID) 50 MCG tablet  3/6/21  Yes Historical Provider, MD   omeprazole (PRILOSEC) 40 MG delayed release capsule Take 40 mg by mouth daily 11/9/20 5/8/21 Yes Historical Provider, MD   apixaban (ELIQUIS) 5 MG TABS tablet Take 5 mg by mouth 2 times daily   Yes Historical Provider, MD   furosemide (LASIX) 20 MG tablet Take 20 mg by mouth daily   Yes Historical Provider, MD   metoprolol tartrate (LOPRESSOR) 25 MG tablet Take 25 mg by mouth 2 times daily   Yes Historical Provider, MD   minocycline (MINOCIN;DYNACIN) 50 MG capsule Take 50 mg by mouth 2 times daily 12/3/20 6/1/21  Historical Provider, MD      Current Facility-Administered Medications: 0.9 % sodium chloride infusion, , Intravenous, Continuous  0.9 % sodium chloride infusion, , Intravenous, PRN  vancomycin (VANCOCIN) 2,000 mg in dextrose 5 % 500 mL IVPB, 2,000 mg, Intravenous, Q12H    Allergies:  Codeine, Meperidine hcl, and Pcn [penicillins]    Social History:   reports that she has never smoked. She has never used smokeless tobacco. She reports that she does not drink alcohol or use drugs. Family History: family history includes Heart Surgery in her father. No h/o sudden cardiac death. No for premature CAD    REVIEW OF SYSTEMS:    · Constitutional: there has been no unanticipated weight loss. There's been No change in energy level, No change in activity level. · Eyes: No visual changes or diplopia. No scleral icterus.   · ENT: No Headaches, hearing loss or vertigo. No mouth sores or sore throat. · Cardiovascular: atrial fib, CHF recurrent  · Respiratory: COPD  · Gastrointestinal: No abdominal pain, appetite loss, blood in stools. No change in bowel or bladder habits. · Genitourinary: GERD  · Musculoskeletal:  No gait disturbance, No weakness or joint complaints. · Integumentary: No rash or pruritis. · Neurological: No headache, diplopia, change in muscle strength, numbness or tingling. No change in gait, balance, coordination, mood, affect, memory, mentation, behavior. · Psychiatric: No anxiety, or depression. · Endocrine: No temperature intolerance. No excessive thirst, fluid intake, or urination. No tremor. · Hematologic/Lymphatic: Chronic anemia. · Allergic/Immunologic: No nasal congestion or hives. PHYSICAL EXAM:      BP (!) 157/99   Pulse 98   Temp 97.8 °F (36.6 °C) (Oral)   Resp 16   Ht 5' 8\" (1.727 m)   Wt 300 lb (136.1 kg)   LMP  (LMP Unknown)   SpO2 98%   BMI 45.61 kg/m²    Constitutional and General Appearance: alert, cooperative, no distress and appears stated age  HEENT: PERRL, no cervical lymphadenopathy. No masses palpable. Normal oral mucosa  Respiratory:  · Normal excursion and expansion without use of accessory muscles  · Resp Auscultation: Good respiratory effort. No for increased work of breathing. On auscultation: clear to auscultation bilaterally  Cardiovascular:  · The apical impulse is not displaced  · Heart tones are crisp and normal. regular S1 and S2. BRICE 3/6 aortic area, SM 3/6 apical area  · Jugular venous pulsation distended  · The carotid upstroke is normal in amplitude and contour without delay or bruit  Peripheral pulses are symmetrical and weak  Abdomen:   · Obese.   · o masses or tenderness  · Bowel sounds present  Extremities:    ·  Skin: Warm and dry+2 edemao abnormalities of mood, affect, memory, mentation, or behavior are noted    DATA Needed to complete full TAVR evaluation:    EKG: ECHO:  Left Ventricle : Left ventricular systolic function is mildly      reduced. There is mild hypokinesis in the anterior wall. There is      mild hypokinesis in the anterolateral wall. LVEF is 40-45%. The      diastolic function is abnormal.      Atria : Right atrium is moderately dilated. The inferior vena cava is      dilated.      Aortic Valve : There is severe valvular aortic stenosis.  Calculated      aortic valve area is 0.4 cm2 with maximum pressure gradient of 115      mmHg and mean pressure gradient of 65 mmHg. The aortic valve is      moderately to severely calcified. Mild aortic regurgitation.      Mitral Valve : Mitral valve leaflets are calcified. Mitral annular      calcification is moderate. Moderate mitral regurgitation.      Tricuspid Valve : Mild tricuspid regurgitation. The RVSP is 43 mmHg.               Electronically Signed by: JORDYN Lujan  01/18/21 1232     ________________________________________         Cardiac Angiography:.   Procedure Summary      Normal coronary arteries   Lower normal LV function   Right heart pressure increase PAP 46/22, PCWP 19 mmHg   Aortic stenosis with mean gradient 61 mmHg and valve area 0.5 cm2   (Calcified aortic valve)      Recommendations      Aortic valve treatment and most likely TAVr due to co-morbid causes,   unless CV surgery think otherwise      Signature      ----------------------------------------------------------------   Electronically signed by Francisco Busby(Performing Physician) on   02/22/2021 17:06    CTA Scan:  Date:  Findings:    Summary of reviews  Annulus Mean Diameter: 28.2 Area: 28.0    LVOT Mean Diameter: 28.4 Area: 28.3    Ascending Aorta Max Diameter: 39.6     Sinotubular Junction MIN Diameter: 33.5 MAX Diameter: 36.9    Sinus of Valsava Diameter LCC: 37.2 RCC: 35.5 NCC: 40.0   Sinus of Valsava Height LCC: 26.3 RCC: 23.6 NCC: 26.1   Coronary [place] Height Left: 18.4 Right: 17.9        PFT:  Date: 2/2021  Findings: FEV1: 75%. STS Score:   STS score for Aortic valve replacment 3%  Mortality / Morbidity: 17.6%. Labs:     CBC: No results for input(s): WBC, HGB, HCT, PLT in the last 72 hours. BMP: No results for input(s): NA, K, CO2, BUN, CREATININE, LABGLOM, GLUCOSE in the last 72 hours. BNP: No results for input(s): BNP in the last 72 hours. PT/INR: No results for input(s): PROTIME, INR in the last 72 hours. APTT:No results for input(s): APTT in the last 72 hours. CARDIAC ENZYMES:No results for input(s): CKTOTAL, CKMB, CKMBINDEX, TROPONINI in the last 72 hours. FASTING LIPID PANEL:No results found for: HDL, LDLDIRECT, LDLCALC, TRIG  LIVER PROFILE:No results for input(s): AST, ALT, LABALBU in the last 72 hours. IMPRESSION:       1. Severe aortic stenosis. 2. Recurrent CHF class III-IV  3. Atrial fibrillation. 4. NILAY. 5. Morbid obesity. 6. COPD. 7. HTN  8. HLP      Patient Active Problem List   Diagnosis    Chronic pulmonary heart disease [facility]    Essential hypertension    Anticoagulated    Aortic stenosis, severe    CHF NYHA class III, chronic, combined ([address]    Depression    Diverticulitis of colon    Esophageal reflux    Hypothyroidism    Insomnia    Iron deficiency anemia    Mixed stress and urge urinary incontinence    Sleep apnea    Atrial fibrillation (HCC)    Rosacea    Moderate mitral regurgitation    Morbid obesity with BMI of 45.0-49.9, adult ([address]       RECOMMENDATIONS:  1. TAVR - Standard protocol: Size 34 Evolute. Held anticoagulation due to procedure. 2. Temporary pacer as indicated  3. Further orders post procedure. Discussed with patient and Nurse.     Electronically signed by Levon Munoz MD on 4/6/2021 at 6:31 AM  [facility] Cardiology Consult           [phone]

## 2021-04-06 NOTE — PROGRESS NOTES
Intermittent loss of capture noted on tele. Patient appears to be pacemaker dependent. CXR shows good position of the lead. Advanced the lead by ~2 cm. Rate set at 70 bpm and 12mV. Patient pacing at 70. Cautioned patient against moving lower extremity to avoid pacemaker dislodgment. Pacing pads jn place. Bedside atropine. AV bharat blocking agents on hold. Discussed with RN. Attending on call updated.      Yarelis gonzalez MD

## 2021-04-06 NOTE — PLAN OF CARE
Problem: Non-Violent Restraints  Goal: Removal from restraints as soon as assessed to be safe  Outcome: Ongoing  Goal: No harm/injury to patient while restraints in use  Outcome: Ongoing  Goal: Patient's dignity will be maintained  Outcome: Ongoing     Problem: Discharge Planning:  Goal: Discharged to appropriate level of care  Description: Discharged to appropriate level of care  Outcome: Ongoing     Problem: Cardiac Output - Decreased:  Goal: Cardiac output within specified parameters  Description: Cardiac output within specified parameters  Outcome: Ongoing     Problem: Infection - Surgical Site:  Goal: Will show no infection signs and symptoms  Description: Will show no infection signs and symptoms  Outcome: Ongoing     Problem: Pain - Acute:  Goal: Pain level will decrease  Description: Pain level will decrease  Outcome: Ongoing     Problem: Venous Thromboembolism:  Goal: Will show no signs or symptoms of venous thromboembolism  Description: Will show no signs or symptoms of venous thromboembolism  Outcome: Ongoing  Goal: Absence of signs or symptoms of impaired coagulation  Description: Absence of signs or symptoms of impaired coagulation  Outcome: Ongoing     Problem: Falls - Risk of:  Goal: Will remain free from falls  Description: Will remain free from falls  Outcome: Ongoing  Goal: Absence of physical injury  Description: Absence of physical injury  Outcome: Ongoing     Problem: Anxiety:  Goal: Level of anxiety will decrease  Description: Level of anxiety will decrease  Outcome: Ongoing     Problem: Bleeding:  Goal: Will show no signs and symptoms of excessive bleeding  Description: Will show no signs and symptoms of excessive bleeding  Outcome: Ongoing

## 2021-04-06 NOTE — PROGRESS NOTES
Dr Chaitanya Ba at bedside. 1835: Transvenous pacing wires advanced by Dr. Chaitanya Ba.  Pacer settings adjusted to 68bpm & 12 Ma

## 2021-04-06 NOTE — PROGRESS NOTES
Dr. Dede Spencer notified that the pt did not have any morning labs ordered. Orders received for a BMP and CBC to be drawn tomorrow morning.  Will continue to monitor

## 2021-04-06 NOTE — CARE COORDINATION
Case Management Initial Discharge Plan  Tito Larose,             Met with:patient to discuss discharge plans. Information verified: address, contacts, phone number, , insurance Yes    Emergency Contact/Next of Kin name & number: Jo Fournier (daughter) [phone]    PCP: Veronica Romero MD  Date of last visit: Dec 21    Insurance Provider: [facility]    Discharge Planning    Living Arrangements:  Parent   Support Systems:  [id number] [address] has 1 stories  4 stairs to climb to get into front door, stairs to climb to reach second floor  Location of bedroom/bathroom in home main    Patient able to perform ADL's:Independent    Current Services (outpatient & in home) none  DME equipment: none  DME provider: none    Receiving oral anticoagulation therapy? Yes Eliquis    If indicated:   Physician managing anticoagulation treatment:   Where does patient obtain lab work for ATC treatment? Potential Assistance Needed:  N/A    Patient agreeable to home care: No  [place] of choice provided:  no    Prior SNF/Rehab Placement and Facility: no  Agreeable to SNF/Rehab: No  [place] of choice provided: no     Evaluation: no    Expected Discharge date:       Patient expects to be discharged to:  home  Follow Up Appointment: Best Day/ Time:      Transportation provider: family  Transportation arrangements needed for discharge: No    Readmission Risk              Risk of Unplanned Readmission:        14             Does patient have a readmission risk score greater than 14?: Yes  If yes, follow-up appointment must be made within 7 days of discharge.      Goals of Care: increased comfort level      Discharge Plan: home with mother          Electronically signed by Dalia Quintanilla RN on 21 at 5:23 PM EDT

## 2021-04-07 DIAGNOSIS — Z95.2 STATUS POST TRANSCATHETER AORTIC VALVE REPLACEMENT: Primary | ICD-10-CM

## 2021-04-07 LAB
ABO/RH: NORMAL
ANION GAP SERPL CALCULATED.3IONS-SCNC: 9 MMOL/L (ref 9–17)
ANTIBODY SCREEN: NEGATIVE
ARM BAND NUMBER: NORMAL
BLD PROD TYP BPU: NORMAL
BUN BLDV-MCNC: 13 MG/DL (ref 8–23)
BUN/CREAT BLD: ABNORMAL (ref 9–20)
CALCIUM SERPL-MCNC: 8.4 MG/DL (ref 8.6–10.4)
CHLORIDE BLD-SCNC: 108 MMOL/L (ref 98–107)
CO2: 24 MMOL/L (ref 20–31)
CREAT SERPL-MCNC: 0.65 MG/DL (ref 0.5–0.9)
CROSSMATCH RESULT: NORMAL
DISPENSE STATUS BLOOD BANK: NORMAL
EXPIRATION DATE: NORMAL
GFR AFRICAN AMERICAN: >60 ML/MIN
GFR NON-AFRICAN AMERICAN: >60 ML/MIN
GFR SERPL CREATININE-BSD FRML MDRD: ABNORMAL ML/MIN/{1.73_M2}
GFR SERPL CREATININE-BSD FRML MDRD: ABNORMAL ML/MIN/{1.73_M2}
GLUCOSE BLD-MCNC: 114 MG/DL (ref 70–99)
HCT VFR BLD CALC: 36.1 % (ref 36.3–47.1)
HEMOGLOBIN: 10.4 G/DL (ref 11.9–15.1)
MCH RBC QN AUTO: 23.5 PG (ref 25.2–33.5)
MCHC RBC AUTO-ENTMCNC: 28.8 G/DL (ref 28.4–34.8)
MCV RBC AUTO: 81.5 FL (ref 82.6–102.9)
NRBC AUTOMATED: 0 PER 100 WBC
PDW BLD-RTO: 16.3 % (ref 11.8–14.4)
PLATELET # BLD: 170 K/UL (ref 138–453)
PMV BLD AUTO: 9.7 FL (ref 8.1–13.5)
POTASSIUM SERPL-SCNC: 4.2 MMOL/L (ref 3.7–5.3)
RBC # BLD: 4.43 M/UL (ref 3.95–5.11)
SODIUM BLD-SCNC: 141 MMOL/L (ref 135–144)
TRANSFUSION STATUS: NORMAL
UNIT DIVISION: 0
UNIT NUMBER: NORMAL
WBC # BLD: 11.2 K/UL (ref 3.5–11.3)

## 2021-04-07 PROCEDURE — B24BZZZ ULTRASONOGRAPHY OF HEART WITH AORTA: ICD-10-PCS | Performed by: INTERNAL MEDICINE

## 2021-04-07 PROCEDURE — 2000000000 HC ICU R&B

## 2021-04-07 PROCEDURE — 36415 COLL VENOUS BLD VENIPUNCTURE: CPT

## 2021-04-07 PROCEDURE — 94761 N-INVAS EAR/PLS OXIMETRY MLT: CPT

## 2021-04-07 PROCEDURE — 6370000000 HC RX 637 (ALT 250 FOR IP): Performed by: STUDENT IN AN ORGANIZED HEALTH CARE EDUCATION/TRAINING PROGRAM

## 2021-04-07 PROCEDURE — 6370000000 HC RX 637 (ALT 250 FOR IP): Performed by: INTERNAL MEDICINE

## 2021-04-07 PROCEDURE — 6360000002 HC RX W HCPCS: Performed by: INTERNAL MEDICINE

## 2021-04-07 PROCEDURE — 80048 BASIC METABOLIC PNL TOTAL CA: CPT

## 2021-04-07 PROCEDURE — 85027 COMPLETE CBC AUTOMATED: CPT

## 2021-04-07 PROCEDURE — 2700000000 HC OXYGEN THERAPY PER DAY

## 2021-04-07 PROCEDURE — 93306 TTE W/DOPPLER COMPLETE: CPT

## 2021-04-07 RX ORDER — ALPRAZOLAM 0.25 MG/1
0.25 TABLET ORAL NIGHTLY PRN
Status: DISCONTINUED | OUTPATIENT
Start: 2021-04-07 | End: 2021-04-10 | Stop reason: HOSPADM

## 2021-04-07 RX ORDER — VANCOMYCIN HYDROCHLORIDE 1 G/200ML
1000 INJECTION, SOLUTION INTRAVENOUS ONCE
Status: DISCONTINUED | OUTPATIENT
Start: 2021-04-07 | End: 2021-04-10 | Stop reason: HOSPADM

## 2021-04-07 RX ADMIN — MORPHINE SULFATE 4 MG: 4 INJECTION INTRAVENOUS at 02:49

## 2021-04-07 RX ADMIN — LEVOTHYROXINE SODIUM 125 MCG: 125 TABLET ORAL at 07:44

## 2021-04-07 RX ADMIN — ALPRAZOLAM 0.25 MG: 0.25 TABLET ORAL at 22:51

## 2021-04-07 RX ADMIN — PANTOPRAZOLE SODIUM 40 MG: 40 TABLET, DELAYED RELEASE ORAL at 16:47

## 2021-04-07 RX ADMIN — CLOPIDOGREL 75 MG: 75 TABLET, FILM COATED ORAL at 07:44

## 2021-04-07 RX ADMIN — MORPHINE SULFATE 4 MG: 4 INJECTION INTRAVENOUS at 12:08

## 2021-04-07 RX ADMIN — MORPHINE SULFATE 4 MG: 4 INJECTION INTRAVENOUS at 07:49

## 2021-04-07 RX ADMIN — BUPROPION HYDROCHLORIDE 150 MG: 150 TABLET, EXTENDED RELEASE ORAL at 07:44

## 2021-04-07 RX ADMIN — PANTOPRAZOLE SODIUM 40 MG: 40 TABLET, DELAYED RELEASE ORAL at 07:44

## 2021-04-07 RX ADMIN — Medication 81 MG: at 07:44

## 2021-04-07 RX ADMIN — MORPHINE SULFATE 4 MG: 4 INJECTION INTRAVENOUS at 22:51

## 2021-04-07 RX ADMIN — MORPHINE SULFATE 4 MG: 4 INJECTION INTRAVENOUS at 00:49

## 2021-04-07 RX ADMIN — MORPHINE SULFATE 4 MG: 4 INJECTION INTRAVENOUS at 18:44

## 2021-04-07 RX ADMIN — MORPHINE SULFATE 4 MG: 4 INJECTION INTRAVENOUS at 15:08

## 2021-04-07 ASSESSMENT — PAIN DESCRIPTION - ORIENTATION: ORIENTATION: LOWER;LEFT

## 2021-04-07 ASSESSMENT — PAIN DESCRIPTION - FREQUENCY
FREQUENCY: CONTINUOUS
FREQUENCY: CONTINUOUS

## 2021-04-07 ASSESSMENT — PAIN DESCRIPTION - ONSET: ONSET: AWAKENED FROM SLEEP

## 2021-04-07 ASSESSMENT — PAIN DESCRIPTION - DESCRIPTORS: DESCRIPTORS: CONSTANT

## 2021-04-07 ASSESSMENT — PAIN DESCRIPTION - PROGRESSION
CLINICAL_PROGRESSION: GRADUALLY IMPROVING
CLINICAL_PROGRESSION: NOT CHANGED

## 2021-04-07 ASSESSMENT — PAIN DESCRIPTION - LOCATION: LOCATION: BACK;KNEE

## 2021-04-07 ASSESSMENT — PAIN DESCRIPTION - PAIN TYPE
TYPE: CHRONIC PAIN
TYPE: CHRONIC PAIN

## 2021-04-07 ASSESSMENT — PAIN SCALES - GENERAL: PAINLEVEL_OUTOF10: 6

## 2021-04-07 NOTE — PLAN OF CARE
Problem: Non-Violent Restraints  Goal: Removal from restraints as soon as assessed to be safe  Outcome: Ongoing  Goal: No harm/injury to patient while restraints in use  Outcome: Ongoing  Goal: Patient's dignity will be maintained  Outcome: Ongoing     Problem: Pain - Acute:  Goal: Pain level will decrease  Description: Pain level will decrease  Outcome: Met This Shift     Problem: Falls - Risk of:  Goal: Will remain free from falls  Description: Will remain free from falls  Outcome: Met This Shift  Goal: Absence of physical injury  Description: Absence of physical injury  Outcome: Met This Shift     Problem: Anxiety:  Goal: Level of anxiety will decrease  Description: Level of anxiety will decrease  Outcome: Ongoing

## 2021-04-07 NOTE — PROGRESS NOTES
Dr. Catarino Rm notified of pts intermittent loss of capture on the pacemaker. Orders received to continue to monitor the pts HR and if she is hemodynamically unstable or HR drops below 40 BPM to externally pace.  Will continue to monitor

## 2021-04-07 NOTE — PROGRESS NOTES
Dr Silvia Chambers at bedside. Stated there is no specific BP parameters, notify team if SBP is greater than 180.

## 2021-04-07 NOTE — PROGRESS NOTES
[facility]   Progress Note                   Date:   4/7/2021  Patient name: Sean Reddy  Date of admission:  4/6/2021 10:27 AM  MRN:   [id number]  YOB: 1960  PCP: Rod Pina MD    Reason for Admission:      Subjective:   Patient is seen and examined. No acute events overnight  Intermittent loss of capture on pacemaker. Patient is requiring pacemaker intermittently. Extremely agitated. Has bilateral ankle restraints ordered. Medications:   Scheduled Meds:   sodium chloride flush  10 mL Intravenous 2 times per day    aspirin  81 mg Oral Daily    clopidogrel  75 mg Oral Daily    apixaban  5 mg Oral BID    buPROPion  150 mg Oral Daily    furosemide  20 mg Oral Daily    levothyroxine  125 mcg Oral Daily    lisinopril  20 mg Oral Daily    pantoprazole  40 mg Oral BID AC       Continuous Infusions:   sodium chloride      sodium chloride 75 mL/hr at 04/06/21 1005       CBC:   Recent Labs     04/06/21  0713 04/07/21  0519   WBC 10.4 11.2   HGB 12.0 10.4*    170     BMP:    Recent Labs     04/06/21  0713 04/06/21  1504 04/07/21  0519    139 141   K 5.5* 4.2 4.2    109* 108*   CO2 21 23 24   BUN 16 15 13   CREATININE 0.76 0.77 0.65   GLUCOSE 102* 127* 114*     Hepatic:   Recent Labs     04/06/21  0713   BILITOT 0.34     Troponin: No results for input(s): TROPONINI in the last 72 hours. BNP: No results for input(s): BNP in the last 72 hours. Lipids: No results for input(s): CHOL, HDL in the last 72 hours.     Invalid input(s): LDLCALCU  INR:   Recent Labs     04/06/21  0652   INR 1.0       Objective:   Vitals: BP (!) 141/79   Pulse 71   Temp 97.7 °F (36.5 °C) (Axillary)   Resp 10   Ht 5' 8\" (1.727 m)   Wt (!) 347 lb 7.1 oz (157.6 kg)   LMP  (LMP Unknown)   SpO2 98%   BMI 52.83 kg/m²     General appearance: awake, alert, in no apparent respiratory distress   HEENT: Head: Normocephalic, no lesions, without obvious abnormality  Neck: no JVD  Lungs: clear to auscultation bilaterally, no basilar rales, no wheezing   Heart: regular rate and rhythm, S1, S2 normal, no murmur, click, rub or gallop  Abdomen: soft, non-tender; bowel sounds normal  Extremities: No LE edema  Neurologic: Mental status: Alert, oriented. Motor and sensory not done. Left Ventricle : Left ventricular systolic function is mildly      reduced. There is mild hypokinesis in the anterior wall. There is      mild hypokinesis in the anterolateral wall. LVEF is 40-45%. The      diastolic function is abnormal.      Atria : Right atrium is moderately dilated. The inferior vena cava is      dilated.      Aortic Valve : There is severe valvular aortic stenosis.  Calculated      aortic valve area is 0.4 cm2 with maximum pressure gradient of 115      mmHg and mean pressure gradient of 65 mmHg. The aortic valve is      moderately to severely calcified. Mild aortic regurgitation.      Mitral Valve : Mitral valve leaflets are calcified. Mitral annular      calcification is moderate. Moderate mitral regurgitation.      Tricuspid Valve : Mild tricuspid regurgitation. The RVSP is 43 mmHg.         Assessment / Acute Cardiac Problems:   1. Severe aortic stenosis s/p TAVR  2. H/o Afib   3. NILAY   4. Recurrent admissions for HF   5. HTN   6. Normal coronaries on cath on 02/22/2021         Plan of Treatment:   1. On ASA, Plavix and Eliquis   2. Post TAVR ECHO pending. 3. Has temporary pacemaker. Requiring intermittently. Patient will likely need permanent pacemaker. 4. BB on hold. Discussed with patient and nursing. Adeola Santiago MD  Fellow, Cardiovascular Diseases   [facility]   Pager - [phone]  Attending Physician Statement  I have discussed the care of the patient, including pertinent history and exam findings, with the resident. I have seen and examined the patient and the key elements of all parts of the encounter have been performed by me.  I agree with the assessment, plan and orders as documented by the resident.     Will get PPM today   Kyle Boyle MD

## 2021-04-07 NOTE — PLAN OF CARE
Problem: Non-Violent Restraints  Goal: Removal from restraints as soon as assessed to be safe  4/7/2021 1521 by Tanner Yao RN  Outcome: Ongoing  4/7/2021 0707 by Davi Stanton RN  Outcome: Ongoing  Goal: No harm/injury to patient while restraints in use  4/7/2021 1521 by Tanner Yao RN  Outcome: Ongoing  4/7/2021 0707 by Davi Stanton RN  Outcome: Ongoing  Goal: Patient's dignity will be maintained  4/7/2021 1521 by Tanner Yao RN  Outcome: Ongoing  4/7/2021 0707 by Davi Stanton RN  Outcome: Ongoing     Problem: Discharge Planning:  Goal: Discharged to appropriate level of care  Description: Discharged to appropriate level of care  Outcome: Ongoing     Problem: Cardiac Output - Decreased:  Goal: Cardiac output within specified parameters  Description: Cardiac output within specified parameters  Outcome: Ongoing     Problem: Infection - Surgical Site:  Goal: Will show no infection signs and symptoms  Description: Will show no infection signs and symptoms  Outcome: Ongoing     Problem: Pain - Acute:  Goal: Pain level will decrease  Description: Pain level will decrease  4/7/2021 1521 by Tanner Yao RN  Outcome: Ongoing  4/7/2021 0707 by Davi Stanton RN  Outcome: Met This Shift     Problem: Venous Thromboembolism:  Goal: Will show no signs or symptoms of venous thromboembolism  Description: Will show no signs or symptoms of venous thromboembolism  Outcome: Ongoing  Goal: Absence of signs or symptoms of impaired coagulation  Description: Absence of signs or symptoms of impaired coagulation  Outcome: Ongoing     Problem: Falls - Risk of:  Goal: Will remain free from falls  Description: Will remain free from falls  4/7/2021 1521 by Tanner Yao RN  Outcome: Ongoing  4/7/2021 0707 by Davi Stanton RN  Outcome: Met This Shift  Goal: Absence of physical injury  Description: Absence of physical injury  4/7/2021 1521 by Tanner Yao RN  Outcome: Ongoing  4/7/2021 0707 by Davi Stanton RN  Outcome: Met This Shift Problem: Anxiety:  Goal: Level of anxiety will decrease  Description: Level of anxiety will decrease  4/7/2021 1521 by Kvng Sawyer RN  Outcome: Ongoing  4/7/2021 0707 by Conner Adorno RN  Outcome: Ongoing     Problem: Bleeding:  Goal: Will show no signs and symptoms of excessive bleeding  Description: Will show no signs and symptoms of excessive bleeding  Outcome: Ongoing     Problem: Pain:  Goal: Pain level will decrease  Description: Pain level will decrease  4/7/2021 1521 by Kvng Sawyer RN  Outcome: Ongoing  4/7/2021 0707 by Conner Adorno RN  Outcome: Met This Shift  Goal: Control of acute pain  Description: Control of acute pain  Outcome: Ongoing  Goal: Control of chronic pain  Description: Control of chronic pain  Outcome: Ongoing     Problem: Skin Integrity:  Goal: Will show no infection signs and symptoms  Description: Will show no infection signs and symptoms  Outcome: Ongoing  Goal: Absence of new skin breakdown  Description: Absence of new skin breakdown  Outcome: Ongoing

## 2021-04-08 ENCOUNTER — ANESTHESIA (OUTPATIENT)
Dept: CARDIAC CATH/INVASIVE PROCEDURES | Age: 61
DRG: 266 | End: 2021-04-08
Payer: COMMERCIAL

## 2021-04-08 ENCOUNTER — ANESTHESIA EVENT (OUTPATIENT)
Dept: CARDIAC CATH/INVASIVE PROCEDURES | Age: 61
DRG: 266 | End: 2021-04-08
Payer: COMMERCIAL

## 2021-04-08 ENCOUNTER — APPOINTMENT (OUTPATIENT)
Dept: GENERAL RADIOLOGY | Age: 61
DRG: 266 | End: 2021-04-08
Attending: INTERNAL MEDICINE
Payer: COMMERCIAL

## 2021-04-08 ENCOUNTER — APPOINTMENT (OUTPATIENT)
Dept: CARDIAC CATH/INVASIVE PROCEDURES | Age: 61
DRG: 266 | End: 2021-04-08
Attending: INTERNAL MEDICINE
Payer: COMMERCIAL

## 2021-04-08 VITALS — OXYGEN SATURATION: 98 % | SYSTOLIC BLOOD PRESSURE: 165 MMHG | TEMPERATURE: 98.3 F | DIASTOLIC BLOOD PRESSURE: 90 MMHG

## 2021-04-08 LAB
ANION GAP SERPL CALCULATED.3IONS-SCNC: 8 MMOL/L (ref 9–17)
BUN BLDV-MCNC: 11 MG/DL (ref 8–23)
BUN/CREAT BLD: ABNORMAL (ref 9–20)
CALCIUM SERPL-MCNC: 8.4 MG/DL (ref 8.6–10.4)
CHLORIDE BLD-SCNC: 106 MMOL/L (ref 98–107)
CO2: 25 MMOL/L (ref 20–31)
CREAT SERPL-MCNC: 0.72 MG/DL (ref 0.5–0.9)
CULTURE: ABNORMAL
GFR AFRICAN AMERICAN: >60 ML/MIN
GFR NON-AFRICAN AMERICAN: >60 ML/MIN
GFR SERPL CREATININE-BSD FRML MDRD: ABNORMAL ML/MIN/{1.73_M2}
GFR SERPL CREATININE-BSD FRML MDRD: ABNORMAL ML/MIN/{1.73_M2}
GLUCOSE BLD-MCNC: 101 MG/DL (ref 70–99)
HCT VFR BLD CALC: 37.1 % (ref 36.3–47.1)
HEMOGLOBIN: 10.6 G/DL (ref 11.9–15.1)
Lab: ABNORMAL
MAGNESIUM: 1.8 MG/DL (ref 1.6–2.6)
MCH RBC QN AUTO: 23.5 PG (ref 25.2–33.5)
MCHC RBC AUTO-ENTMCNC: 28.6 G/DL (ref 28.4–34.8)
MCV RBC AUTO: 82.3 FL (ref 82.6–102.9)
NRBC AUTOMATED: 0 PER 100 WBC
PDW BLD-RTO: 16.8 % (ref 11.8–14.4)
PLATELET # BLD: 146 K/UL (ref 138–453)
PMV BLD AUTO: 10.3 FL (ref 8.1–13.5)
POTASSIUM SERPL-SCNC: 4.3 MMOL/L (ref 3.7–5.3)
RBC # BLD: 4.51 M/UL (ref 3.95–5.11)
SODIUM BLD-SCNC: 139 MMOL/L (ref 135–144)
SPECIMEN DESCRIPTION: ABNORMAL
WBC # BLD: 8.7 K/UL (ref 3.5–11.3)

## 2021-04-08 PROCEDURE — 85027 COMPLETE CBC AUTOMATED: CPT

## 2021-04-08 PROCEDURE — 80048 BASIC METABOLIC PNL TOTAL CA: CPT

## 2021-04-08 PROCEDURE — 2709999900 HC NON-CHARGEABLE SUPPLY

## 2021-04-08 PROCEDURE — 3700000001 HC ADD 15 MINUTES (ANESTHESIA)

## 2021-04-08 PROCEDURE — 2500000003 HC RX 250 WO HCPCS: Performed by: SPECIALIST

## 2021-04-08 PROCEDURE — 6360000002 HC RX W HCPCS: Performed by: SPECIALIST

## 2021-04-08 PROCEDURE — 6360000002 HC RX W HCPCS

## 2021-04-08 PROCEDURE — 3700000000 HC ANESTHESIA ATTENDED CARE

## 2021-04-08 PROCEDURE — 6370000000 HC RX 637 (ALT 250 FOR IP): Performed by: INTERNAL MEDICINE

## 2021-04-08 PROCEDURE — C1898 LEAD, PMKR, OTHER THAN TRANS: HCPCS

## 2021-04-08 PROCEDURE — 2580000003 HC RX 258: Performed by: INTERNAL MEDICINE

## 2021-04-08 PROCEDURE — 6360000002 HC RX W HCPCS: Performed by: INTERNAL MEDICINE

## 2021-04-08 PROCEDURE — 83735 ASSAY OF MAGNESIUM: CPT

## 2021-04-08 PROCEDURE — 6370000000 HC RX 637 (ALT 250 FOR IP): Performed by: STUDENT IN AN ORGANIZED HEALTH CARE EDUCATION/TRAINING PROGRAM

## 2021-04-08 PROCEDURE — C1894 INTRO/SHEATH, NON-LASER: HCPCS

## 2021-04-08 PROCEDURE — 0JH606Z INSERTION OF PACEMAKER, DUAL CHAMBER INTO CHEST SUBCUTANEOUS TISSUE AND FASCIA, OPEN APPROACH: ICD-10-PCS | Performed by: INTERNAL MEDICINE

## 2021-04-08 PROCEDURE — 2500000003 HC RX 250 WO HCPCS

## 2021-04-08 PROCEDURE — 02HK3JZ INSERTION OF PACEMAKER LEAD INTO RIGHT VENTRICLE, PERCUTANEOUS APPROACH: ICD-10-PCS | Performed by: INTERNAL MEDICINE

## 2021-04-08 PROCEDURE — C1785 PMKR, DUAL, RATE-RESP: HCPCS

## 2021-04-08 PROCEDURE — 2060000000 HC ICU INTERMEDIATE R&B

## 2021-04-08 PROCEDURE — 36415 COLL VENOUS BLD VENIPUNCTURE: CPT

## 2021-04-08 PROCEDURE — 2580000003 HC RX 258: Performed by: SPECIALIST

## 2021-04-08 PROCEDURE — 33208 INSRT HEART PM ATRIAL & VENT: CPT | Performed by: INTERNAL MEDICINE

## 2021-04-08 PROCEDURE — 02H63JZ INSERTION OF PACEMAKER LEAD INTO RIGHT ATRIUM, PERCUTANEOUS APPROACH: ICD-10-PCS | Performed by: INTERNAL MEDICINE

## 2021-04-08 PROCEDURE — 71045 X-RAY EXAM CHEST 1 VIEW: CPT

## 2021-04-08 RX ORDER — VANCOMYCIN HYDROCHLORIDE 1 G/20ML
INJECTION, POWDER, LYOPHILIZED, FOR SOLUTION INTRAVENOUS PRN
Status: DISCONTINUED | OUTPATIENT
Start: 2021-04-08 | End: 2021-04-08 | Stop reason: SDUPTHER

## 2021-04-08 RX ORDER — DEXAMETHASONE SODIUM PHOSPHATE 10 MG/ML
INJECTION INTRAMUSCULAR; INTRAVENOUS PRN
Status: DISCONTINUED | OUTPATIENT
Start: 2021-04-08 | End: 2021-04-08 | Stop reason: SDUPTHER

## 2021-04-08 RX ORDER — SODIUM CHLORIDE 9 MG/ML
INJECTION, SOLUTION INTRAVENOUS CONTINUOUS PRN
Status: DISCONTINUED | OUTPATIENT
Start: 2021-04-08 | End: 2021-04-08 | Stop reason: SDUPTHER

## 2021-04-08 RX ORDER — PROPOFOL 10 MG/ML
INJECTION, EMULSION INTRAVENOUS PRN
Status: DISCONTINUED | OUTPATIENT
Start: 2021-04-08 | End: 2021-04-08 | Stop reason: SDUPTHER

## 2021-04-08 RX ORDER — FENTANYL CITRATE 50 UG/ML
INJECTION, SOLUTION INTRAMUSCULAR; INTRAVENOUS PRN
Status: DISCONTINUED | OUTPATIENT
Start: 2021-04-08 | End: 2021-04-08 | Stop reason: SDUPTHER

## 2021-04-08 RX ORDER — ONDANSETRON 2 MG/ML
INJECTION INTRAMUSCULAR; INTRAVENOUS PRN
Status: DISCONTINUED | OUTPATIENT
Start: 2021-04-08 | End: 2021-04-08 | Stop reason: SDUPTHER

## 2021-04-08 RX ORDER — VANCOMYCIN HYDROCHLORIDE 1 G/200ML
1000 INJECTION, SOLUTION INTRAVENOUS ONCE
Status: COMPLETED | OUTPATIENT
Start: 2021-04-09 | End: 2021-04-09

## 2021-04-08 RX ORDER — LIDOCAINE HYDROCHLORIDE 10 MG/ML
INJECTION, SOLUTION INFILTRATION; PERINEURAL PRN
Status: DISCONTINUED | OUTPATIENT
Start: 2021-04-08 | End: 2021-04-08 | Stop reason: SDUPTHER

## 2021-04-08 RX ORDER — SODIUM CHLORIDE 0.9 % (FLUSH) 0.9 %
5-40 SYRINGE (ML) INJECTION EVERY 12 HOURS SCHEDULED
Status: DISCONTINUED | OUTPATIENT
Start: 2021-04-08 | End: 2021-04-10 | Stop reason: HOSPADM

## 2021-04-08 RX ORDER — SODIUM CHLORIDE 0.9 % (FLUSH) 0.9 %
5-40 SYRINGE (ML) INJECTION PRN
Status: DISCONTINUED | OUTPATIENT
Start: 2021-04-08 | End: 2021-04-10 | Stop reason: HOSPADM

## 2021-04-08 RX ORDER — SODIUM CHLORIDE, SODIUM LACTATE, POTASSIUM CHLORIDE, CALCIUM CHLORIDE 600; 310; 30; 20 MG/100ML; MG/100ML; MG/100ML; MG/100ML
INJECTION, SOLUTION INTRAVENOUS CONTINUOUS PRN
Status: DISCONTINUED | OUTPATIENT
Start: 2021-04-08 | End: 2021-04-08 | Stop reason: SDUPTHER

## 2021-04-08 RX ORDER — ROCURONIUM BROMIDE 10 MG/ML
INJECTION, SOLUTION INTRAVENOUS PRN
Status: DISCONTINUED | OUTPATIENT
Start: 2021-04-08 | End: 2021-04-08 | Stop reason: SDUPTHER

## 2021-04-08 RX ORDER — SODIUM CHLORIDE 9 MG/ML
25 INJECTION, SOLUTION INTRAVENOUS PRN
Status: DISCONTINUED | OUTPATIENT
Start: 2021-04-08 | End: 2021-04-10 | Stop reason: HOSPADM

## 2021-04-08 RX ADMIN — ALPRAZOLAM 0.25 MG: 0.25 TABLET ORAL at 21:55

## 2021-04-08 RX ADMIN — LEVOTHYROXINE SODIUM 125 MCG: 125 TABLET ORAL at 09:10

## 2021-04-08 RX ADMIN — SODIUM CHLORIDE: 900 INJECTION INTRAVENOUS at 13:32

## 2021-04-08 RX ADMIN — MORPHINE SULFATE 4 MG: 4 INJECTION INTRAVENOUS at 23:26

## 2021-04-08 RX ADMIN — SODIUM CHLORIDE, PRESERVATIVE FREE 10 ML: 5 INJECTION INTRAVENOUS at 21:55

## 2021-04-08 RX ADMIN — LIDOCAINE HYDROCHLORIDE 50 MG: 10 INJECTION, SOLUTION INFILTRATION; PERINEURAL at 13:32

## 2021-04-08 RX ADMIN — ROCURONIUM BROMIDE 50 MG: 10 INJECTION INTRAVENOUS at 13:32

## 2021-04-08 RX ADMIN — MORPHINE SULFATE 4 MG: 4 INJECTION INTRAVENOUS at 16:32

## 2021-04-08 RX ADMIN — FUROSEMIDE 20 MG: 20 TABLET ORAL at 17:41

## 2021-04-08 RX ADMIN — VANCOMYCIN HYDROCHLORIDE 1000 MG: 1 INJECTION, POWDER, LYOPHILIZED, FOR SOLUTION INTRAVENOUS at 13:40

## 2021-04-08 RX ADMIN — PROPOFOL 150 MG: 10 INJECTION, EMULSION INTRAVENOUS at 13:32

## 2021-04-08 RX ADMIN — PANTOPRAZOLE SODIUM 40 MG: 40 TABLET, DELAYED RELEASE ORAL at 09:10

## 2021-04-08 RX ADMIN — ONDANSETRON 4 MG: 2 INJECTION INTRAMUSCULAR; INTRAVENOUS at 23:26

## 2021-04-08 RX ADMIN — VANCOMYCIN HYDROCHLORIDE 1000 MG: 1 INJECTION, POWDER, LYOPHILIZED, FOR SOLUTION INTRAVENOUS at 16:19

## 2021-04-08 RX ADMIN — SODIUM CHLORIDE, POTASSIUM CHLORIDE, SODIUM LACTATE AND CALCIUM CHLORIDE: 600; 310; 30; 20 INJECTION, SOLUTION INTRAVENOUS at 13:32

## 2021-04-08 RX ADMIN — FENTANYL CITRATE 50 MCG: 50 INJECTION, SOLUTION INTRAMUSCULAR; INTRAVENOUS at 14:28

## 2021-04-08 RX ADMIN — DEXAMETHASONE SODIUM PHOSPHATE 4 MG: 10 INJECTION INTRAMUSCULAR; INTRAVENOUS at 13:44

## 2021-04-08 RX ADMIN — MORPHINE SULFATE 4 MG: 4 INJECTION INTRAVENOUS at 11:16

## 2021-04-08 RX ADMIN — LISINOPRIL 20 MG: 20 TABLET ORAL at 17:42

## 2021-04-08 RX ADMIN — MORPHINE SULFATE 4 MG: 4 INJECTION INTRAVENOUS at 07:26

## 2021-04-08 RX ADMIN — SODIUM CHLORIDE, PRESERVATIVE FREE 10 ML: 5 INJECTION INTRAVENOUS at 21:56

## 2021-04-08 RX ADMIN — PANTOPRAZOLE SODIUM 40 MG: 40 TABLET, DELAYED RELEASE ORAL at 16:20

## 2021-04-08 RX ADMIN — MORPHINE SULFATE 4 MG: 4 INJECTION INTRAVENOUS at 04:22

## 2021-04-08 RX ADMIN — MORPHINE SULFATE 4 MG: 4 INJECTION INTRAVENOUS at 01:50

## 2021-04-08 RX ADMIN — ONDANSETRON 4 MG: 2 INJECTION INTRAMUSCULAR; INTRAVENOUS at 15:25

## 2021-04-08 RX ADMIN — SUGAMMADEX 200 MG: 100 INJECTION, SOLUTION INTRAVENOUS at 15:28

## 2021-04-08 RX ADMIN — BUPROPION HYDROCHLORIDE 150 MG: 150 TABLET, EXTENDED RELEASE ORAL at 09:11

## 2021-04-08 RX ADMIN — FENTANYL CITRATE 100 MCG: 50 INJECTION, SOLUTION INTRAMUSCULAR; INTRAVENOUS at 13:32

## 2021-04-08 ASSESSMENT — PAIN DESCRIPTION - LOCATION
LOCATION: BACK;KNEE
LOCATION: KNEE

## 2021-04-08 ASSESSMENT — PULMONARY FUNCTION TESTS
PIF_VALUE: 31
PIF_VALUE: 25
PIF_VALUE: 32
PIF_VALUE: 35
PIF_VALUE: 36
PIF_VALUE: 28
PIF_VALUE: 28
PIF_VALUE: 35
PIF_VALUE: 26
PIF_VALUE: 32
PIF_VALUE: 29
PIF_VALUE: 35
PIF_VALUE: 26
PIF_VALUE: 34
PIF_VALUE: 35
PIF_VALUE: 31
PIF_VALUE: 24
PIF_VALUE: 28
PIF_VALUE: 31
PIF_VALUE: 31
PIF_VALUE: 29
PIF_VALUE: 34
PIF_VALUE: 35
PIF_VALUE: 28
PIF_VALUE: 28
PIF_VALUE: 31
PIF_VALUE: 21
PIF_VALUE: 37
PIF_VALUE: 28
PIF_VALUE: 26
PIF_VALUE: 35
PIF_VALUE: 31
PIF_VALUE: 37
PIF_VALUE: 32
PIF_VALUE: 7
PIF_VALUE: 38
PIF_VALUE: 34
PIF_VALUE: 39
PIF_VALUE: 35
PIF_VALUE: 35
PIF_VALUE: 36
PIF_VALUE: 26
PIF_VALUE: 35
PIF_VALUE: 35
PIF_VALUE: 26
PIF_VALUE: 31
PIF_VALUE: 31
PIF_VALUE: 29
PIF_VALUE: 40
PIF_VALUE: 3
PIF_VALUE: 26
PIF_VALUE: 37
PIF_VALUE: 32
PIF_VALUE: 35
PIF_VALUE: 36
PIF_VALUE: 31
PIF_VALUE: 36
PIF_VALUE: 35
PIF_VALUE: 31
PIF_VALUE: 31
PIF_VALUE: 35
PIF_VALUE: 34
PIF_VALUE: 35
PIF_VALUE: 35
PIF_VALUE: 34
PIF_VALUE: 28
PIF_VALUE: 29

## 2021-04-08 ASSESSMENT — PAIN DESCRIPTION - ORIENTATION
ORIENTATION: LEFT
ORIENTATION: LEFT;LOWER

## 2021-04-08 ASSESSMENT — PAIN DESCRIPTION - PAIN TYPE
TYPE: CHRONIC PAIN

## 2021-04-08 ASSESSMENT — PAIN SCALES - GENERAL
PAINLEVEL_OUTOF10: 7
PAINLEVEL_OUTOF10: 8
PAINLEVEL_OUTOF10: 9
PAINLEVEL_OUTOF10: 8
PAINLEVEL_OUTOF10: 7
PAINLEVEL_OUTOF10: 6

## 2021-04-08 ASSESSMENT — PAIN DESCRIPTION - FREQUENCY: FREQUENCY: CONTINUOUS

## 2021-04-08 NOTE — PROGRESS NOTES
Physical Therapy    DATE: 2021    NAME: Christian Saucedo  MRN: [id number]   : 1960      Patient not seen this date for Physical Therapy due to:    Surgery/Procedure: per RN, going for pacemaker today, currently flat bed rest and 4 point restraints; check       Electronically signed by Sofya Puentes PT on 2021 at 11:24 AM

## 2021-04-08 NOTE — ANESTHESIA POSTPROCEDURE EVALUATION
Department of Anesthesiology  Postprocedure Note    Patient: Marshall Tsang  MRN: [id number]  YOB: 1960  Date of evaluation: 4/8/2021  Time:  4:30 PM     Procedure Summary     Date: 04/08/21 Room / Location: [facility] Cath Lab    Anesthesia Start: 1324 Anesthesia Stop: 5305    Procedure: CATH LAB WITH ANESTHESIA Diagnosis:     Scheduled Providers: Kenneth Grady MD; RADHA Mendez - CRNA Responsible Provider: Kenneth Grady MD    Anesthesia Type: MAC, general ASA Status: 4          Anesthesia Type: MAC, general    Sera Phase I: Sera Score: 9    Sera Phase II:      Last vitals: Reviewed and per EMR flowsheets.        Anesthesia Post Evaluation    Patient location during evaluation: ICU  Patient participation: complete - patient participated  Level of consciousness: awake and alert  Pain score: 0  Airway patency: patent  Nausea & Vomiting: no vomiting and no nausea  Complications: no  Cardiovascular status: hemodynamically stable  Respiratory status: acceptable  Hydration status: stable

## 2021-04-08 NOTE — PLAN OF CARE
Problem: Non-Violent Restraints  Goal: Removal from restraints as soon as assessed to be safe  Outcome: Completed  Goal: No harm/injury to patient while restraints in use  Outcome: Completed  Goal: Patient's dignity will be maintained  Outcome: Completed   No longer needing the restraints. Pt temp pacer removed.

## 2021-04-08 NOTE — BRIEF OP NOTE
Brief Postoperative Note      Patient: Stiven Santamaria  YOB: 1960  MRN: [id number]       DATE OF PROCEDURE:  4/8/21      Pre-Op Diagnosis:   1) Third degree AV block   2) s/p TAVR   3)  Persistent AF    Post-Op Diagnosis:  Same       PROCEDURE:   1)  Implantation of dual chamber pacemaker  2)  Intraoperative lead testing    Assistant:  None    Anesthesia:  General endotracheal anesthesia    Access:  Left subclavian vein ( 7F X2)     Estimated Blood Loss (mL):  10 ml    Complications:  None    Specimens: None      Implants:  Medtronic Gita XT DR MRI  Medtronic [id number] lead at RA appendage  Medtronic [id number] lead at high RV septum        FINDINGS:  All impedances and thresholds at RA and RV septum remained stable and WNL. The Medtronc Gita XT DR MRI pacemaker is programmed in DDD mode, 75/110 ppm, with mode switching active.     Electronically signed by Cheri Guajardo MD on 4/8/2021 at 4:10 PM

## 2021-04-08 NOTE — CARE COORDINATION
Met with pt to discuss transitional planning. Her plan is to return home with mother. She may need PT since she has been laying flat. She is agreeable to home care for PT. [place] of choice list given for pt to review. If she is unable to drive, she does not have anyone to take her to outpatient therapy. She is not agreeable to SNF as she takes care of her mother at home.  PPM placement planned for 1pm

## 2021-04-08 NOTE — PROGRESS NOTES
[facility]   Progress Note                   Date:   4/8/2021  Patient name: Robyn Rivas  Date of admission:  4/6/2021 10:27 AM  MRN:   [id number]  YOB: 1960  PCP: Santiago Fonseca MD    Reason for Admission:      Subjective:   Patient is seen and examined. No acute events overnight  Intermittent loss of capture on pacemaker. Patient is requiring pacemaker intermittently. Extremely agitated. Has bilateral ankle restraints ordered. Medications:   Scheduled Meds:   vancomycin  1,000 mg Intravenous Once    vancomycin irrigation  1,000 mg Irrigation Once    sodium chloride flush  10 mL Intravenous 2 times per day    aspirin  81 mg Oral Daily    clopidogrel  75 mg Oral Daily    [Held by provider] apixaban  5 mg Oral BID    buPROPion  150 mg Oral Daily    furosemide  20 mg Oral Daily    levothyroxine  125 mcg Oral Daily    lisinopril  20 mg Oral Daily    pantoprazole  40 mg Oral BID AC       Continuous Infusions:   sodium chloride      sodium chloride 75 mL/hr at 04/06/21 1005       CBC:   Recent Labs     04/06/21  0713 04/07/21  0519 04/08/21  0526   WBC 10.4 11.2 8.7   HGB 12.0 10.4* 10.6*    170 146     BMP:    Recent Labs     04/06/21  1504 04/07/21  0519 04/08/21  0526    141 139   K 4.2 4.2 4.3   * 108* 106   CO2 23 24 25   BUN 15 13 11   CREATININE 0.77 0.65 0.72   GLUCOSE 127* 114* 101*     Hepatic:   Recent Labs     04/06/21  0713   BILITOT 0.34     Troponin: No results for input(s): TROPONINI in the last 72 hours. BNP: No results for input(s): BNP in the last 72 hours. Lipids: No results for input(s): CHOL, HDL in the last 72 hours.     Invalid input(s): LDLCALCU  INR:   Recent Labs     04/06/21  0652   INR 1.0       Objective:   Vitals: BP (!) 157/85   Pulse 70   Temp 98.6 °F (37 °C) (Axillary)   Resp 10   Ht 5' 8\" (1.727 m)   Wt (!) 347 lb 7.1 oz (157.6 kg)   LMP  (LMP Unknown)   SpO2 94%   BMI 52.83 kg/m²     General appearance: awake, alert, in no apparent respiratory distress   HEENT: Head: Normocephalic, no lesions, without obvious abnormality  Neck: no JVD  Lungs: clear to auscultation bilaterally, no basilar rales, no wheezing   Heart: regular rate and rhythm, S1, S2 normal, no murmur, click, rub or gallop  Abdomen: soft, non-tender; bowel sounds normal  Extremities: No LE edema  Neurologic: Mental status: Alert, oriented. Motor and sensory not done. Left Ventricle : Left ventricular systolic function is mildly      reduced. There is mild hypokinesis in the anterior wall. There is      mild hypokinesis in the anterolateral wall. LVEF is 40-45%. The      diastolic function is abnormal.      Atria : Right atrium is moderately dilated. The inferior vena cava is      dilated.      Aortic Valve : There is severe valvular aortic stenosis.  Calculated      aortic valve area is 0.4 cm2 with maximum pressure gradient of 115      mmHg and mean pressure gradient of 65 mmHg. The aortic valve is      moderately to severely calcified. Mild aortic regurgitation.      Mitral Valve : Mitral valve leaflets are calcified. Mitral annular      calcification is moderate. Moderate mitral regurgitation.      Tricuspid Valve : Mild tricuspid regurgitation. The RVSP is 43 mmHg.         Assessment / Acute Cardiac Problems:   1. Severe aortic stenosis s/p TAVR  2. H/o Afib   3. NILAY   4. Recurrent admissions for HF   5. HTN   6. Normal coronaries on cath on 02/22/2021         Plan of Treatment:   1. On ASA, Plavix and Eliquis. Plavix and Eliquis on hold. 2. Post TAVR ECHO pending. 3. Has temporary pacemaker. Requiring intermittently. 4. PPM today  5. BB on hold. Discussed with patient and nursing. Samina Whittaker MD  Fellow, Cardiovascular Diseases   [address]   Pager - [phone]  Attending Physician Statement  I have discussed the care of the patient, including pertinent history and exam findings, with the resident. I have seen and examined the patient and the key elements of all parts of the encounter have been performed by me. I agree with the assessment, plan and orders as documented by the resident.   Getting PPM today  Hoda Gaines MD

## 2021-04-08 NOTE — ANESTHESIA PRE PROCEDURE
Department of Anesthesiology  Preprocedure Note       Name:  Basia Ozuna   Age:  57 y.o.  :  1960                                          MRN:  [id number]         Date:  2021      Surgeon: * No surgeons listed *    Procedure: * No procedures listed *    Medications prior to admission:   Prior to Admission medications    Medication Sig Start Date End Date Taking?  Authorizing Provider   spironolactone (ALDACTONE) 25 MG tablet Take 25 mg by mouth daily   Yes Historical Provider, MD   lisinopril (PRINIVIL;ZESTRIL) 20 MG tablet Take 20 mg by mouth daily   Yes Historical Provider, MD   buPROPion (WELLBUTRIN XL) 150 MG extended release tablet Take 150 mg by mouth daily 20 Yes Historical Provider, MD   levothyroxine (SYNTHROID) 50 MCG tablet  3/6/21  Yes Historical Provider, MD   omeprazole (PRILOSEC) 40 MG delayed release capsule Take 40 mg by mouth daily 20 Yes Historical Provider, MD   apixaban (ELIQUIS) 5 MG TABS tablet Take 5 mg by mouth 2 times daily   Yes Historical Provider, MD   furosemide (LASIX) 20 MG tablet Take 20 mg by mouth daily   Yes Historical Provider, MD   metoprolol tartrate (LOPRESSOR) 25 MG tablet Take 25 mg by mouth 2 times daily   Yes Historical Provider, MD   minocycline (MINOCIN;DYNACIN) 50 MG capsule Take 50 mg by mouth 2 times daily 12/3/20 6/1/21  Historical Provider, MD       Current medications:    Current Facility-Administered Medications   Medication Dose Route Frequency Provider Last Rate Last Admin    vancomycin (VANCOCIN) 1000 mg in dextrose 5% 200 mL IVPB  1,000 mg Intravenous Once Rafat Navarrete MD        vancomycin (VANCOCIN) 1,000 mg in sodium chloride 0.9 % 1,000 mL irrigation  1,000 mg Irrigation Once Rafat Navarrete MD        ALPRAZolam [facility]) tablet 0.25 mg  0.25 mg Oral Nightly PRN Sidney Solano MD   0.25 mg at 211    0.9 % sodium chloride infusion   Intravenous PRN Bartolo Numbers, APRN - CNP        0.9 % sodium chloride infusion   Intravenous Continuous Bradley Camp MD 75 mL/hr at 04/06/21 1005 New Bag at 04/06/21 1005    sodium chloride flush 0.9 % injection 10 mL  10 mL Intravenous 2 times per day Bradley Camp MD        sodium chloride flush 0.9 % injection 10 mL  10 mL Intravenous PRN Bradley Camp MD        acetaminophen (TYLENOL) tablet 650 mg  650 mg Oral Q4H PRN Bradley Camp MD        ondansetron (ZOFRAN) injection 4 mg  4 mg Intravenous Q6H PRN Bradley Camp MD        aspirin EC tablet 81 mg  81 mg Oral Daily Bradley Camp MD   Stopped at 04/08/21 0911    clopidogrel (PLAVIX) tablet 75 mg  75 mg Oral Daily Bradley Camp MD   Stopped at 04/08/21 0911    [Held by provider] apixaban (ELIQUIS) tablet 5 mg  5 mg Oral BID Bradley Camp MD   5 mg at 04/06/21 2001    buPROPion (WELLBUTRIN XL) extended release tablet 150 mg  150 mg Oral Daily Bradley Camp MD   150 mg at 04/08/21 0911    furosemide (LASIX) tablet 20 mg  20 mg Oral Daily Bradley Camp MD        levothyroxine (SYNTHROID) tablet 125 mcg  125 mcg Oral Daily Bradley Camp MD   125 mcg at 04/08/21 0910    lisinopril (PRINIVIL;ZESTRIL) tablet 20 mg  20 mg Oral Daily Bradley Camp MD        pantoprazole (PROTONIX) tablet 40 mg  40 mg Oral BID AC Bradley Camp MD   40 mg at 04/08/21 0910    morphine (PF) injection 2 mg  2 mg Intravenous Q2H PRN Bradley Camp MD   2 mg at 04/06/21 2001    Or    morphine injection 4 mg  4 mg Intravenous Q2H PRN Bradley Camp MD   4 mg at 04/08/21 1116       Allergies:     Allergies   Allergen Reactions    Codeine     Meperidine Hcl Other (See Comments)    Pcn [Penicillins]        Problem List:    Patient Active Problem List   Diagnosis Code    Chronic pulmonary heart disease ([facility] [address] I27.9    Essential hypertension I10    Anticoagulated Z79.01    Aortic stenosis, severe I35.0    CHF NYHA class III, chronic, combined (HCC) I50.42    Depression F32.9    Diverticulitis of colon K57.32    Esophageal reflux K21.9  Hypothyroidism E03.9    Insomnia G47.00    Iron deficiency anemia D50.9    Mixed stress and urge urinary incontinence N39.46    Sleep apnea G47.30    Atrial fibrillation (HCC) I48.91    Rosacea L71.9    Moderate mitral regurgitation I34.0    Morbid obesity with BMI of 45.0-49.9, adult ([facility]) E66.01, Z68.42    S/P TAVR (transcatheter aortic valve replacement) Z95.2    Breast nodule N63.0    Thyroid nodule E04.1       Past Medical History:        Diagnosis Date    Anxiety     Aortic stenosis, severe 12/29/2020    Atrial fibrillation (HCC)     [facility] NY[facility] class III, chronic, [facility] 12/29/2020    Depression     Hyperlipidemia     Hypertension     Hypothyroidism 10/27/2011    Moderate mitral regurgitation 3/30/2021    Morbid obesity with BMI of 45.0-49.9, adult ([facility] 3/30/2021    Obesity 6/3/2015    Sleep apnea 11/2/2020    Non compliant with therapy    Thyroid disease        Past Surgical History:        Procedure Laterality Date    CARDIAC CATHETERIZATION  02/22/2021    CHOLECYSTECTOMY      UPPER GASTROINTESTINAL ENDOSCOPY N/A 1/20/2019    EGD DIAGNOSTIC ONLY performed by Maged Berman MD at [facility]       Social History:    Social History     Tobacco Use    Smoking status: Never Smoker    Smokeless tobacco: Never Used   Substance Use Topics    Alcohol use:  No                                Counseling given: Not Answered      Vital Signs (Current):   Vitals:    04/08/21 0700 04/08/21 0800 04/08/21 0855 04/08/21 1000   BP:  132/78 (!) 152/68 132/67   Pulse: 70 70 70 70   Resp: 11 9 12 11   Temp:   98.1 °F (36.7 °C)    TempSrc:   Oral    SpO2: 99% 97% 98% 98%   Weight:       Height:                                                  BP Readings from Last 3 Encounters:   04/08/21 132/67   04/06/21 110/67   03/10/21 109/80       NPO Status: Time of last liquid consumption: 0630                        Time of last solid consumption: 2200                        Date of last liquid consumption: 04/06/21                        Date of last solid food consumption: 04/05/21    BMI:   Wt Readings from Last 3 Encounters:   04/07/21 (!) 347 lb 7.1 oz (157.6 kg)   03/10/21 (!) 322 lb (146.1 kg)   02/22/21 300 lb (136.1 kg)     Body mass index is 52.83 kg/m². CBC:   Lab Results   Component Value Date    WBC 8.7 04/08/2021    RBC 4.51 04/08/2021    HGB 10.6 04/08/2021    HCT 37.1 04/08/2021    MCV 82.3 04/08/2021    RDW 16.8 04/08/2021     04/08/2021       CMP:   Lab Results   Component Value Date     04/08/2021    K 4.3 04/08/2021     04/08/2021    CO2 25 04/08/2021    BUN 11 04/08/2021    CREATININE 0.72 04/08/2021    GFRAA >60 04/08/2021    LABGLOM >60 04/08/2021    GLUCOSE 101 04/08/2021    GLUCOSE 97 03/30/2021    CALCIUM 8.4 04/08/2021    BILITOT 0.34 04/06/2021       POC Tests:   Recent Labs     04/06/21  0652 04/06/21  0652 04/06/21  1043   POCGLU Result not available. < > 117*   POCNA 146  --   --    POCK 4.0  --   --    POCCL 112*  --   --    POCHEMO 13.6  --   --    POCHCT 40  --   --     < > = values in this interval not displayed.        Coags:   Lab Results   Component Value Date    PROTIME 11.7 04/06/2021    INR 1.0 04/06/2021       HCG (If Applicable): No results found for: PREGTESTUR, PREGSERUM, HCG, HCGQUANT     ABGs: No results found for: PHART, PO2ART, RVO5EGZ, JVY9BCT, BEART, E2GLGHSM     Type & Screen (If Applicable):  No results found for: LABABO, LABRH    Drug/Infectious Status (If Applicable):  No results found for: HIV, HEPCAB    COVID-19 Screening (If Applicable):   Lab Results   Component Value Date    COVID19 Not Detected 04/01/2021           Anesthesia Evaluation  Patient summary reviewed no history of anesthetic complications:   Airway: Mallampati: III  TM distance: >3 FB   Neck ROM: full  Mouth opening: > = 3 FB Dental:      Comment: Multiple missing    Pulmonary:normal exam    (+) sleep apnea: on CPAP,                             Cardiovascular: (+) hypertension: no interval change, CHF: no interval change,       ECG reviewed  Rhythm: regular  Rate: normal  Echocardiogram reviewed                  Neuro/Psych:   (+) psychiatric history:depression/anxiety             GI/Hepatic/Renal:   (+) GERD: no interval change, morbid obesity          Endo/Other:    (+) hypothyroidism::., .                 Abdominal:   (+) obese,         Vascular: negative vascular ROS. Anesthesia Plan      MAC and general     ASA 4       Induction: intravenous. Anesthetic plan and risks discussed with patient. Use of blood products discussed with patient whom consented to blood products. Plan discussed with CRNA.                   Althea Turner MD   4/8/2021

## 2021-04-08 NOTE — PLAN OF CARE
Problem: Non-Violent Restraints  Goal: Removal from restraints as soon as assessed to be safe  4/8/2021 0203 by Neetu Mg RN  Outcome: Ongoing  4/8/2021 0202 by Neetu Mg RN  Outcome: Ongoing  4/7/2021 1521 by Shazia Ruelas RN  Outcome: Ongoing  Goal: No harm/injury to patient while restraints in use  4/8/2021 0203 by Neetu Mg RN  Outcome: Ongoing  4/8/2021 0202 by Neetu Mg RN  Outcome: Ongoing  4/7/2021 1521 by Shazia Ruelas RN  Outcome: Ongoing  Goal: Patient's dignity will be maintained  4/8/2021 0203 by Neetu Mg RN  Outcome: Ongoing  4/8/2021 0202 by Neetu Mg RN  Outcome: Ongoing  4/7/2021 1521 by Shazia Ruelas RN  Outcome: Ongoing     Problem: Discharge Planning:  Goal: Discharged to appropriate level of care  Description: Discharged to appropriate level of care  4/8/2021 0203 by Neetu Mg RN  Outcome: Ongoing  4/7/2021 1521 by Shazia Ruelas RN  Outcome: Ongoing     Problem: Cardiac Output - Decreased:  Goal: Cardiac output within specified parameters  Description: Cardiac output within specified parameters  4/8/2021 0203 by Neetu Mg RN  Outcome: Ongoing  4/7/2021 1521 by Shazia Ruelas RN  Outcome: Ongoing     Problem: Infection - Surgical Site:  Goal: Will show no infection signs and symptoms  Description: Will show no infection signs and symptoms  4/8/2021 0203 by Neetu Mg RN  Outcome: Ongoing  4/7/2021 1521 by Shazia Ruelas RN  Outcome: Ongoing     Problem: Pain - Acute:  Goal: Pain level will decrease  Description: Pain level will decrease  4/8/2021 0203 by Neetu Mg RN  Outcome: Ongoing  4/7/2021 1521 by Shazia Ruelas RN  Outcome: Ongoing     Problem: Venous Thromboembolism:  Goal: Will show no signs or symptoms of venous thromboembolism  Description: Will show no signs or symptoms of venous thromboembolism  4/8/2021 0203 by Neetu Mg RN  Outcome: Ongoing  4/7/2021 1521 by Shazia Ruelas RN  Outcome: Ongoing  Goal: Absence of signs or symptoms of impaired coagulation  Description: Absence of signs or symptoms of impaired coagulation  4/8/2021 0203 by Marco Antonio Kelly RN  Outcome: Ongoing  4/7/2021 1521 by Brooke Mercer RN  Outcome: Ongoing     Problem: Falls - Risk of:  Goal: Will remain free from falls  Description: Will remain free from falls  4/8/2021 0203 by Marco Antonio Kelly RN  Outcome: Ongoing  4/7/2021 1521 by Brooke Mercer RN  Outcome: Ongoing  Goal: Absence of physical injury  Description: Absence of physical injury  4/8/2021 0203 by Marco Antonio Kelly RN  Outcome: Ongoing  4/7/2021 1521 by Brooke Mercer RN  Outcome: Ongoing     Problem: Anxiety:  Goal: Level of anxiety will decrease  Description: Level of anxiety will decrease  4/8/2021 0203 by Marco Antonio Kelly RN  Outcome: Ongoing  4/7/2021 1521 by Brooke Mercer RN  Outcome: Ongoing     Problem: Bleeding:  Goal: Will show no signs and symptoms of excessive bleeding  Description: Will show no signs and symptoms of excessive bleeding  4/8/2021 0203 by Marco Antonio Kelly RN  Outcome: Ongoing  4/7/2021 1521 by Brooke Mercer RN  Outcome: Ongoing     Problem: Pain:  Goal: Pain level will decrease  Description: Pain level will decrease  4/8/2021 0203 by Marco Antonio Kelly RN  Outcome: Ongoing  4/7/2021 1521 by Brooke Mercer RN  Outcome: Ongoing  Goal: Control of acute pain  Description: Control of acute pain  4/8/2021 0203 by Marco Antonio Kelly RN  Outcome: Ongoing  4/7/2021 1521 by Brooke Mercer RN  Outcome: Ongoing  Goal: Control of chronic pain  Description: Control of chronic pain  4/8/2021 0203 by Marco Antonio Kelly RN  Outcome: Ongoing  4/7/2021 1521 by Brooke Mercer RN  Outcome: Ongoing     Problem: Skin Integrity:  Goal: Will show no infection signs and symptoms  Description: Will show no infection signs and symptoms  4/8/2021 0203 by Marco Antonio Kelly RN  Outcome: Ongoing  4/7/2021 1521 by Brooke Mercer RN  Outcome: Ongoing  Goal: Absence of new skin breakdown  Description: Absence of new skin breakdown  4/8/2021 0203 by Lizzie Landis RN  Outcome: Ongoing  4/7/2021 1521 by Bere Brooks RN  Outcome: Ongoing

## 2021-04-09 ENCOUNTER — APPOINTMENT (OUTPATIENT)
Dept: GENERAL RADIOLOGY | Age: 61
DRG: 266 | End: 2021-04-09
Attending: INTERNAL MEDICINE
Payer: COMMERCIAL

## 2021-04-09 LAB
ANION GAP SERPL CALCULATED.3IONS-SCNC: 8 MMOL/L (ref 9–17)
BUN BLDV-MCNC: 10 MG/DL (ref 8–23)
BUN/CREAT BLD: ABNORMAL (ref 9–20)
CALCIUM SERPL-MCNC: 9 MG/DL (ref 8.6–10.4)
CHLORIDE BLD-SCNC: 105 MMOL/L (ref 98–107)
CO2: 29 MMOL/L (ref 20–31)
CREAT SERPL-MCNC: 0.62 MG/DL (ref 0.5–0.9)
GFR AFRICAN AMERICAN: >60 ML/MIN
GFR NON-AFRICAN AMERICAN: >60 ML/MIN
GFR SERPL CREATININE-BSD FRML MDRD: ABNORMAL ML/MIN/{1.73_M2}
GFR SERPL CREATININE-BSD FRML MDRD: ABNORMAL ML/MIN/{1.73_M2}
GLUCOSE BLD-MCNC: 112 MG/DL (ref 70–99)
HCT VFR BLD CALC: 36.1 % (ref 36.3–47.1)
HEMOGLOBIN: 10.6 G/DL (ref 11.9–15.1)
MAGNESIUM: 1.9 MG/DL (ref 1.6–2.6)
MCH RBC QN AUTO: 23.3 PG (ref 25.2–33.5)
MCHC RBC AUTO-ENTMCNC: 29.4 G/DL (ref 28.4–34.8)
MCV RBC AUTO: 79.3 FL (ref 82.6–102.9)
NRBC AUTOMATED: 0 PER 100 WBC
PDW BLD-RTO: 15.8 % (ref 11.8–14.4)
PLATELET # BLD: 152 K/UL (ref 138–453)
PMV BLD AUTO: 9.9 FL (ref 8.1–13.5)
POTASSIUM SERPL-SCNC: 4.3 MMOL/L (ref 3.7–5.3)
RBC # BLD: 4.55 M/UL (ref 3.95–5.11)
SODIUM BLD-SCNC: 142 MMOL/L (ref 135–144)
WBC # BLD: 8.3 K/UL (ref 3.5–11.3)

## 2021-04-09 PROCEDURE — 97162 PT EVAL MOD COMPLEX 30 MIN: CPT

## 2021-04-09 PROCEDURE — 85027 COMPLETE CBC AUTOMATED: CPT

## 2021-04-09 PROCEDURE — 2700000000 HC OXYGEN THERAPY PER DAY

## 2021-04-09 PROCEDURE — 6370000000 HC RX 637 (ALT 250 FOR IP): Performed by: STUDENT IN AN ORGANIZED HEALTH CARE EDUCATION/TRAINING PROGRAM

## 2021-04-09 PROCEDURE — 36415 COLL VENOUS BLD VENIPUNCTURE: CPT

## 2021-04-09 PROCEDURE — 97530 THERAPEUTIC ACTIVITIES: CPT

## 2021-04-09 PROCEDURE — 6360000002 HC RX W HCPCS: Performed by: INTERNAL MEDICINE

## 2021-04-09 PROCEDURE — 80048 BASIC METABOLIC PNL TOTAL CA: CPT

## 2021-04-09 PROCEDURE — 97166 OT EVAL MOD COMPLEX 45 MIN: CPT

## 2021-04-09 PROCEDURE — 97535 SELF CARE MNGMENT TRAINING: CPT

## 2021-04-09 PROCEDURE — 2580000003 HC RX 258: Performed by: INTERNAL MEDICINE

## 2021-04-09 PROCEDURE — 6370000000 HC RX 637 (ALT 250 FOR IP): Performed by: INTERNAL MEDICINE

## 2021-04-09 PROCEDURE — 6370000000 HC RX 637 (ALT 250 FOR IP): Performed by: NURSE PRACTITIONER

## 2021-04-09 PROCEDURE — 71045 X-RAY EXAM CHEST 1 VIEW: CPT

## 2021-04-09 PROCEDURE — 83735 ASSAY OF MAGNESIUM: CPT

## 2021-04-09 PROCEDURE — 2060000000 HC ICU INTERMEDIATE R&B

## 2021-04-09 RX ORDER — OXYCODONE HYDROCHLORIDE AND ACETAMINOPHEN 5; 325 MG/1; MG/1
2 TABLET ORAL EVERY 6 HOURS PRN
Status: DISCONTINUED | OUTPATIENT
Start: 2021-04-09 | End: 2021-04-10 | Stop reason: HOSPADM

## 2021-04-09 RX ADMIN — FUROSEMIDE 20 MG: 20 TABLET ORAL at 10:03

## 2021-04-09 RX ADMIN — PANTOPRAZOLE SODIUM 40 MG: 40 TABLET, DELAYED RELEASE ORAL at 18:00

## 2021-04-09 RX ADMIN — PANTOPRAZOLE SODIUM 40 MG: 40 TABLET, DELAYED RELEASE ORAL at 10:03

## 2021-04-09 RX ADMIN — BUPROPION HYDROCHLORIDE 150 MG: 150 TABLET, EXTENDED RELEASE ORAL at 10:03

## 2021-04-09 RX ADMIN — LEVOTHYROXINE SODIUM 125 MCG: 125 TABLET ORAL at 10:03

## 2021-04-09 RX ADMIN — LISINOPRIL 20 MG: 20 TABLET ORAL at 10:03

## 2021-04-09 RX ADMIN — Medication 81 MG: at 10:03

## 2021-04-09 RX ADMIN — SODIUM CHLORIDE, PRESERVATIVE FREE 10 ML: 5 INJECTION INTRAVENOUS at 21:12

## 2021-04-09 RX ADMIN — OXYCODONE HYDROCHLORIDE AND ACETAMINOPHEN 2 TABLET: 5; 325 TABLET ORAL at 19:47

## 2021-04-09 RX ADMIN — METOPROLOL TARTRATE 25 MG: 25 TABLET ORAL at 21:12

## 2021-04-09 RX ADMIN — VANCOMYCIN HYDROCHLORIDE 1000 MG: 1 INJECTION, SOLUTION INTRAVENOUS at 04:30

## 2021-04-09 RX ADMIN — CLOPIDOGREL 75 MG: 75 TABLET, FILM COATED ORAL at 10:03

## 2021-04-09 ASSESSMENT — PAIN SCALES - GENERAL: PAINLEVEL_OUTOF10: 0

## 2021-04-09 ASSESSMENT — PAIN SCALES - WONG BAKER
WONGBAKER_NUMERICALRESPONSE: 2
WONGBAKER_NUMERICALRESPONSE: 2

## 2021-04-09 ASSESSMENT — PAIN DESCRIPTION - PAIN TYPE
TYPE: CHRONIC PAIN
TYPE: CHRONIC PAIN

## 2021-04-09 ASSESSMENT — PAIN DESCRIPTION - LOCATION: LOCATION: KNEE

## 2021-04-09 ASSESSMENT — PAIN DESCRIPTION - DESCRIPTORS: DESCRIPTORS: DISCOMFORT

## 2021-04-09 NOTE — PROGRESS NOTES
Physical Therapy    Facility/Department: [facility]  Initial Assessment    NAME: Farida Myles  : 1960  MRN: [id number]    Date of Service: 2021  S/P TAVR (21); S/P Pacemaker (21)    Discharge Recommendations:    Further therapy recommended at discharge. PT Equipment Recommendations  Equipment Needed: (Continue to assess- pt may benefit from use of RW with plan to trial next visit)    Assessment   Body structures, Functions, Activity limitations: Decreased functional mobility ; Decreased endurance;Decreased ROM; Decreased strength;Decreased balance;Decreased posture;Decreased safe awareness; Increased pain  Assessment: Pt ambulated 3ft w/ RUE HHA modA to bedside chair. Pt is a fall risk and demonstrates significant unsteadiness with ambulation this date due to reported bilateral knee pain. Recommending continued skilled physical therapy to address endurance and functional mobility deficits to return pt to prior level of independence. Prognosis: Good  Decision Making: Medium Complexity  PT Education: Goals;Plan of Care;PT Role;General Safety  Patient Education: Pacemaker precautions; decreasing fall risk; use of AD to increase gait stabilit  REQUIRES PT FOLLOW UP: Yes  Activity Tolerance  Activity Tolerance: Patient limited by endurance       Patient Diagnosis(es): Diagnoses of S/P TAVR (transcatheter aortic valve replacement) and Breast nodule were pertinent to this visit. has a past medical history of Anxiety, Aortic stenosis, severe, Atrial fibrillation ([address] CHF NYHA class III, chronic, combined ([address] Depression, Hyperlipidemia, Hypertension, Hypothyroidism, Moderate mitral regurgitation, Morbid obesity with BMI of 45.0-49.9, adult ([address] Obesity, Sleep apnea, and Thyroid disease. has a past surgical history that includes Cholecystectomy;  Upper gastrointestinal endoscopy (N/A, 2019); and Cardiac catheterization (02/22/2021). Restrictions  Restrictions/Precautions  Restrictions/Precautions: Bedrest with Bathroom Privileges  Required Braces or Orthoses?: Yes  Implants present? : Pacemaker  Required Braces or Orthoses  Left Upper Extremity Brace/Splint: Sling(For 24hrs starting 4/8 @1600)  Position Activity Restriction  Other position/activity restrictions: S/p TAVR (4/6/21); PPM placed (4/8/21)  Vision/Hearing  Vision: Impaired  Vision Exceptions: Wears glasses at all times  Hearing: Within functional limits     Subjective  General  Patient assessed for rehabilitation services?: Yes  Response To Previous Treatment: Not applicable  Family / Caregiver Present: No  Follows Commands: Within Functional Limits  Subjective  Subjective: RN and pt in agreement for PT eval; pt supine in bed upon PT arrival, pt pleasant and cooperative throughout session. LUE sling donned prior to performance of mobility.   Pain Screening  Patient Currently in Pain: Yes  Pain Assessment  Pain Assessment: Faces  Carter-Baker Pain Rating: Hurts a little bit  Pain Type: Chronic pain  Pain Location: Knee  Pain Orientation: Right;Left  Pain Descriptors: Discomfort  Non-Pharmaceutical Pain Intervention(s): Ambulation/Increased Activity;Repositioned  Response to Pain Intervention: Patient Satisfied  Multiple Pain Sites: No  Vital Signs  Patient Currently in Pain: Yes       Orientation  Orientation  Overall Orientation Status: Within Functional Limits  Social/Functional History  Social/Functional History  Lives With: Family(81 yo mother)  Type of Home: Mobile home  Home Layout: One level  Home Access: Stairs to enter with rails  Entrance Stairs - Number of Steps: 4  Entrance Stairs - Rails: Left  Bathroom Shower/Tub: Walk-in shower  Bathroom Toilet: Standard  Bathroom Equipment: Grab bars in shower  Receives Help From: Family  ADL Assistance: [address] [place] [address] Avenue: Independent  Homemaking Responsibilities: Yes  Meal Prep Responsibility: Primary  Laundry Responsibility: Primary  Cleaning Responsibility: Primary  Health Care Management: Primary(reports mother is independent, pt completes IADLs)  Ambulation Assistance: Independent(pt independently ambulates with no AD at baseline, pt reports inability to ambulate community distances recently due to bilateral knee pain and endurance deficits)  Transfer Assistance: Independent  Active : Yes  Mode of Transportation: SUV  Occupation: Full time employment  Type of occupation: desk job  Leisure & Hobbies: reading  Additional Comments: 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  Cognition   Cognition  Overall Cognitive Status: Exceptions  Insights: Decreased awareness of deficits  Sequencing: Requires cues for some    Objective          Joint Mobility  Spine: WFL  ROM RLE: WFL  ROM LLE: WFL  ROM RUE: WFL  ROM LUE: Did not assess LUE ROM this date due to pacemaker placement  Strength RLE  Strength RLE: WFL  Strength LLE  Strength LLE: WFL  Strength RUE  Strength RUE: WFL  Strength LUE  Strength LUE: Exception  Comment: Did not assess LUE ROM this date due to pacemaker placement  Motor Control  Gross Motor?: WFL  Sensation  Overall Sensation Status: WFL  Bed mobility  Supine to Sit: Moderate assistance;2 Person assistance  Sit to Supine: (Did not formally assess- pt retired seated in bedside chair upon writer's exit)  Scooting: Moderate assistance  Comment: HOB elevated to ~45 degrees; pt required verbal cueing throughout mobility to prevent pulling on LUE for performance of bed mobility with fair return demo. Pt reports increased groin pain while seated EOB. Transfers  Sit to Stand: Moderate Assistance;2 Person Assistance  Stand to sit: Moderate Assistance;2 Person Assistance  Comment: HHA provided to pt with RUE support. Pt required verbal cueing for sequencing with fair return demo.   Ambulation  Ambulation?: Yes  Ambulation 1  Surface: level tile  Device: Hand-Held Assist(RUE)  Assistance: Moderate assistance  Quality of Gait: Forward flexed posture; signficiant hip and knee flexion  Gait Deviations: Slow Radha; Shuffles;Staggers  Distance: 3ft  Comments: Pt demonstrates significant unsteadiness, high reliance on writer for transfer to bedside chair with RUE. Stairs/Curb  Stairs?: No     Balance  Posture: Fair  Sitting - Static: Good  Sitting - Dynamic: Good;-  Standing - Static: Fair;-  Standing - Dynamic: Poor;+  Comments: Standing balance assessed w/ RUE HHA        Plan   Plan  Times per week: 5-6x/week  Current Treatment Recommendations: Strengthening, Transfer Training, Endurance Training, Patient/Caregiver Education & Training, ROM, Balance Training, Gait Training, Home Exercise Program, Functional Mobility Training, Stair training, Safety Education & Training  Safety Devices  Type of devices: Left in chair, Call light within reach, Gait belt, Nurse notified  Restraints  Initially in place: No  AM-PAC Score     AM-PAC Inpatient Mobility without Stair Climbing Raw Score : 10 (04/09/21 1512)  AM-PAC Inpatient without Stair Climbing T-Scale Score : 34.07 (04/09/21 1512)  Mobility Inpatient CMS 0-100% Score: 71.66 (04/09/21 1512)  Mobility Inpatient without Stair CMS G-Code Modifier : CL (04/09/21 1512)       Goals  Short term goals  Time Frame for Short term goals: 14  Short term goal 1: Pt to perform bed mobility CGA  Short term goal 2: Pt to demonstrate functional transfers CGA  Short term goal 3: Ambulate 100ft w/ least restrictive AD CGA  Short term goal 4: Tolerate 30 minutes of therapy to demo increased endurance  Patient Goals   Patient goals :  To go home       Therapy Time   Individual Concurrent Group Co-treatment   Time In 1005         Time Out 1041         Minutes 36         Timed Code Treatment Minutes: 23 Minutes       Nas Vasquez, PT

## 2021-04-09 NOTE — PROGRESS NOTES
RN asked Patient to wear BiPap as her apnea monitor continues to alarm. Patient stated she is aware that she is supposed to wear a cpap at home but does not wear it and she also stated she is just fine without it. RN explained the purpose of the BiPap and benefits but Patient does not want to wear the BiPap.

## 2021-04-09 NOTE — DISCHARGE INSTR - COC
Continuity of Care Form    Patient Name: Riri Moseley   :  1960  MRN:  [id number]    Admit date:  2021  Discharge date:  ***    Code Status Order: Full Code   Advance Directives:   Advance Care Flowsheet Documentation       Date/Time Healthcare Directive Type of Healthcare Directive Copy in [facility] [address] Agent's Name Healthcare Agent's Phone Number    21 7545  No, patient does not have an advance directive for healthcare treatment -- -- -- -- --    21 0625  No, patient does not have an advance directive for healthcare treatment -- -- -- -- --            Admitting Physician:  Jayjay Manzanares MD  PCP: Beka Hilliard MD    Discharging Nurse: [facility] Unit/Room#: 8650/2718-67  Discharging Unit Phone Number: ***    Emergency Contact:   Extended Emergency Contact Information  [facility]  Address: [address] [place], [place] Najjar of [address] Phone: [phone]  Relation: Child    Past Surgical History:  Past Surgical History:   Procedure Laterality Date    CARDIAC CATHETERIZATION  2021    CHOLECYSTECTOMY      UPPER GASTROINTESTINAL ENDOSCOPY N/A 2019    EGD DIAGNOSTIC ONLY performed by Jessee Stephenson MD at [facility]       Immunization History: There is no immunization history on file for this patient.     Active Problems:  Patient Active Problem List   Diagnosis Code    Chronic pulmonary heart disease ([facility] I27.9    Essential hypertension I10    Anticoagulated Z79.01    Aortic stenosis, severe I35.0    CHF NYHA class III, chronic, combined ([facility]) I50.42    Depression F32.9    Diverticulitis of colon K57.32    Esophageal reflux K21.9    Hypothyroidism E03.9    Insomnia G47.00    Iron deficiency anemia D50.9    Mixed stress and urge urinary incontinence N39.46    Sleep apnea G47.30    Atrial fibrillation ([facility]) I48.91    Rosacea L71.9    Moderate mitral regurgitation I34.0    Morbid obesity with BMI of 45.0-49.9, adult ([facility]) E66.01, Z68.42    S/P TAVR (transcatheter aortic valve replacement) Z95.2    Breast nodule N63.0    Thyroid nodule E04.1       Isolation/Infection:   Isolation            No Isolation          Patient Infection Status       Infection Onset Added Last Indicated Last Indicated By Review Planned Expiration Resolved Resolved By    None active    Resolved    COVID-19 Rule Out 04/01/21 04/01/21 04/01/21 COVID-19 (Ordered)   04/02/21 Rule-Out Test Resulted    COVID-19 Rule Out 03/22/21 03/22/21 03/22/21 COVID-19 (Ordered)   03/23/21 Rule-Out Test Resulted            Nurse Assessment:  Last Vital Signs: BP (!) 142/74   Pulse 75   Temp 98.4 °F (36.9 °C) (Oral)   Resp 21   Ht 5' 8\" (1.727 m)   Wt (!) 348 lb 8.8 oz (158.1 kg)   LMP  (LMP Unknown)   SpO2 99%   BMI 53.00 kg/m²     Last documented pain score (0-10 scale): Pain Level: 0  Last Weight:   Wt Readings from Last 1 Encounters:   04/09/21 (!) 348 lb 8.8 oz (158.1 kg)     Mental Status:  oriented and alert    IV Access:  - None    Nursing Mobility/ADLs:  Walking   Assisted  Transfer  Assisted  Bathing  Assisted  Dressing  {CHP DME ADLs:[id number]:::0}  Toileting  Independent  Feeding  Independent  Med Admin  Independent  Med Delivery   whole    Wound Care Documentation and Therapy:        Elimination:  Continence:   · Bowel: No  · Bladder: No  Urinary Catheter: None   Colostomy/Ileostomy/Ileal Conduit: No       Date of Last BM: 4/10/2021    Intake/Output Summary (Last 24 hours) at 4/9/2021 1706  Last data filed at 4/9/2021 0400  Gross per 24 hour   Intake --   Output 2300 ml   Net -2300 ml     I/O last 3 completed shifts: In: 1400 [I.V.:1400]  Out: 2310 [Urine:2300; Blood:10]    Safety Concerns:      At Risk for Falls    Impairments/Disabilities:      None    Nutrition Therapy:  Current Nutrition Therapy:   - Oral Diet:  General    Routes of Feeding: Oral  Liquids: No Restrictions  Daily Fluid Restriction: no  Last Modified Barium Swallow with Video (Video Swallowing Test): not done    Treatments at the Time of Hospital Discharge:   Respiratory Treatments: ***  Oxygen Therapy:  is not on home oxygen therapy. Ventilator:    - No ventilator support    Rehab Therapies: Physical Therapy and Occupational Therapy  Weight Bearing Status/Restrictions: No weight bearing restirctions  Other Medical Equipment (for information only, NOT a DME order):  walker  Other Treatments: skilled nursing    Patient's personal belongings (please select all that are sent with patient):  Glasses    RN SIGNATURE:  Electronically signed by Natalio Amos RN on 4/10/21 at 3:19 PM EDT    CASE MANAGEMENT/SOCIAL WORK SECTION    Inpatient Status Date: 4-6-2021    Readmission Risk Assessment Score:  Readmission Risk              Risk of Unplanned Readmission:        13           Discharging to Facility/ Agency   · [facility]  · Address:  · Phone:  · Fax:    Dialysis Facility (if applicable)   · Name:  · Address:  · Dialysis Schedule:  · Phone:  · Fax:    / signature: Electronically signed by Diana Perez RN on 4/10/21 at 3:02 PM EDT    PHYSICIAN SECTION    Prognosis: Fair    Condition at Discharge: Stable    Rehab Potential (if transferring to Rehab): Fair    Recommended Labs or Other Treatments After Discharge: See after care summary, PT/OT    Physician Certification: I certify the above information and transfer of Alton Mujica  is necessary for the continuing treatment of the diagnosis listed and that she requires Home Care for less 30 days.      Update Admission H&P: No change in H&P    PHYSICIAN SIGNATURE:  Electronically signed by Gaby Roman MD on 4/9/21 at 5:08 PM EDT

## 2021-04-09 NOTE — PLAN OF CARE
Problem: Discharge Planning:  Goal: Discharged to appropriate level of care  Description: Discharged to appropriate level of care  Outcome: Ongoing     Problem: Cardiac Output - Decreased:  Goal: Cardiac output within specified parameters  Description: Cardiac output within specified parameters  Outcome: Ongoing     Problem: Infection - Surgical Site:  Goal: Will show no infection signs and symptoms  Description: Will show no infection signs and symptoms  Outcome: Ongoing     Problem: Pain - Acute:  Goal: Pain level will decrease  Description: Pain level will decrease  Outcome: Ongoing     Problem: Venous Thromboembolism:  Goal: Will show no signs or symptoms of venous thromboembolism  Description: Will show no signs or symptoms of venous thromboembolism  Outcome: Ongoing  Goal: Absence of signs or symptoms of impaired coagulation  Description: Absence of signs or symptoms of impaired coagulation  Outcome: Ongoing     Problem: Falls - Risk of:  Goal: Will remain free from falls  Description: Will remain free from falls  Outcome: Ongoing  Goal: Absence of physical injury  Description: Absence of physical injury  Outcome: Ongoing     Problem: Anxiety:  Goal: Level of anxiety will decrease  Description: Level of anxiety will decrease  Outcome: Ongoing     Problem: Bleeding:  Goal: Will show no signs and symptoms of excessive bleeding  Description: Will show no signs and symptoms of excessive bleeding  Outcome: Ongoing     Problem: Pain:  Goal: Pain level will decrease  Description: Pain level will decrease  Outcome: Ongoing  Goal: Control of acute pain  Description: Control of acute pain  Outcome: Ongoing  Goal: Control of chronic pain  Description: Control of chronic pain  Outcome: Ongoing     Problem: Skin Integrity:  Goal: Will show no infection signs and symptoms  Description: Will show no infection signs and symptoms  Outcome: Ongoing  Goal: Absence of new skin breakdown  Description: Absence of new skin breakdown  Outcome: Ongoing

## 2021-04-09 NOTE — PROGRESS NOTES
[facility]   Progress Note                   Date:   4/9/2021  Patient name: Morteza Pack  Date of admission:  4/6/2021 10:27 AM  MRN:   [id number]  YOB: 1960  PCP: Richelle Call MD    Reason for Admission:      Subjective:   Patient is seen and examined alone in room after discussing with RN. No acute events overnight. PPM placed yesterday without issues/concerns. No agitation today. A&O x3. States her knees are very sore from not moving but otherwise denies any pain. Wants to go home. Medications:   Scheduled Meds:   sodium chloride flush  5-40 mL Intravenous 2 times per day    vancomycin  1,000 mg Intravenous Once    vancomycin irrigation  1,000 mg Irrigation Once    sodium chloride flush  10 mL Intravenous 2 times per day    aspirin  81 mg Oral Daily    clopidogrel  75 mg Oral Daily    buPROPion  150 mg Oral Daily    furosemide  20 mg Oral Daily    levothyroxine  125 mcg Oral Daily    lisinopril  20 mg Oral Daily    pantoprazole  40 mg Oral BID AC       Continuous Infusions:   sodium chloride      sodium chloride      sodium chloride 75 mL/hr at 04/06/21 1005       CBC:   Recent Labs     04/07/21  0519 04/08/21  0526 04/09/21  0505   WBC 11.2 8.7 8.3   HGB 10.4* 10.6* 10.6*    146 152     BMP:    Recent Labs     04/07/21  0519 04/08/21  0526 04/09/21  0505    139 142   K 4.2 4.3 4.3   * 106 105   CO2 24 25 29   BUN 13 11 10   CREATININE 0.65 0.72 0.62   GLUCOSE 114* 101* 112*     Hepatic:   No results for input(s): AST, ALT, ALB, BILITOT, ALKPHOS in the last 72 hours. Troponin: No results for input(s): TROPONINI in the last 72 hours. BNP: No results for input(s): BNP in the last 72 hours. Lipids: No results for input(s): CHOL, HDL in the last 72 hours. Invalid input(s): LDLCALCU  INR:   No results for input(s): INR in the last 72 hours.     Objective:   Vitals: BP (!) 142/74   Pulse 75   Temp 98.4 °F (36.9 °C) (Oral)   Resp 21   Ht 5' 8\" (1.727 m)   Wt (!) 348 lb 8.8 oz (158.1 kg)   LMP  (LMP Unknown)   SpO2 99%   BMI 53.00 kg/m²     General appearance: awake, alert, in no apparent respiratory distress . A&Ox3  HEENT: Head: Normocephalic, no lesions, without obvious abnormality  Neck: no JVD  Lungs: clear to auscultation bilaterally, no basilar rales, no wheezing . Diminished d/t body habitus  Heart: regular rate and rhythm, S1, S2 normal, no murmur, click, rub or gallop. AV Paced 1:1  Abdomen: soft, non-tender; bowel sounds normal, Morbid obesity  Extremities: No LE edema  B/L groin sites CDI. Dressings removed. Mild ecchymosis noted left > right. No drainage. Left chest site CDI. Dressing removed and left open to air. Steri-strips intact. Neurologic: Mental status: Alert, oriented. Motor and sensory not done. Left Ventricle : Left ventricular systolic function is mildly      reduced. There is mild hypokinesis in the anterior wall. There is      mild hypokinesis in the anterolateral wall. LVEF is 40-45%. The      diastolic function is abnormal.      Atria : Right atrium is moderately dilated. The inferior vena cava is      dilated.      Aortic Valve : There is severe valvular aortic stenosis.  Calculated      aortic valve area is 0.4 cm2 with maximum pressure gradient of 115      mmHg and mean pressure gradient of 65 mmHg. The aortic valve is      moderately to severely calcified. Mild aortic regurgitation.      Mitral Valve : Mitral valve leaflets are calcified. Mitral annular      calcification is moderate. Moderate mitral regurgitation.      Tricuspid Valve : Mild tricuspid regurgitation. The RVSP is 43 mmHg.         PPM placement 4/8/21  Implants:  Medtronic Hughson XT DR VILLALBA  Medtronic [id number] lead at RA appendage  Medtronic [id number] lead at high RV septum         FINDINGS:  All impedances and thresholds at RA and RV septum remained stable and WNL.   The Medtronc Gita XT DR MRI pacemaker is programmed in DDD mode, 75/110 ppm, with mode switching active. Assessment / Acute Cardiac Problems:   1. Severe aortic stenosis s/p TAVR  2. H/o Afib. 3. Bradycardia post TAVR s/p PPM placement 4/8/21  4. NILAY on CPAP  5. Recurrent admissions for HF   6. HTN   7. Normal coronaries on cath on 02/22/2021         Plan of Treatment:   1. Continue PO ASA, Plavix, Lasix, ACE. Will resume home BB & Eliquis. 2. Post TAVR ECHO pending. 3. PPM site CDI. Discussed post PPM activity restrictions including but not limited to lifting, pulling/pushing, left arm restrictions and site care. Questions/concerns addressed. Pt. Lives at home with her 81y/o mother. 4. Will get PT/OT recommendations no discharge. Pt. Agreeable to homecare and home PT/OT. Post TAVR echo reviewed with Dr. Alysha Pepper. EF 55%, AV peak 2.5m/s, mean gradient 17, no regurg.

## 2021-04-09 NOTE — PROGRESS NOTES
Pt being moved back to bed after sitting in chair. This was pt's first time up to chair and states 'it felt good. \" She confirms her wishes to go home and not to SNF/Rehab. Her f/u Cardio appts are typed into her Discharge Summary and she is reminded of 1 w and 1 month CARDIO appts. Pt updated on thyroid nodule and L breast lesion noted on CTA chest/abd/pelvis performed for TAVR work up. Pt is aware of breast lesion and states she has not had f/u mammograms recently \"because of covid. \" She is advised follow up on thyroid nodule via US. Pt's pcp was already updated on these finding [facility],  his office was called, and reports faxed to office. Pt is updated on these actions, and she has no further questions. She understands to f/u with PCP office regarding these issues.      Darnell Schilder APRN CNP  [facility]

## 2021-04-09 NOTE — PLAN OF CARE
Problem: Discharge Planning:  Goal: Discharged to appropriate level of care  Description: Discharged to appropriate level of care  Outcome: Ongoing     Problem: Cardiac Output - Decreased:  Goal: Cardiac output within specified parameters  Description: Cardiac output within specified parameters  Outcome: Ongoing     Problem: Infection - Surgical Site:  Goal: Will show no infection signs and symptoms  Description: Will show no infection signs and symptoms  Outcome: Ongoing     Problem: Pain - Acute:  Goal: Pain level will decrease  Description: Pain level will decrease  Outcome: Ongoing     Problem: Venous Thromboembolism:  Goal: Will show no signs or symptoms of venous thromboembolism  Description: Will show no signs or symptoms of venous thromboembolism  Outcome: Ongoing  Goal: Absence of signs or symptoms of impaired coagulation  Description: Absence of signs or symptoms of impaired coagulation  Outcome: Ongoing     Problem: Falls - Risk of:  Goal: Will remain free from falls  Description: Will remain free from falls  Outcome: Ongoing  Goal: Absence of physical injury  Description: Absence of physical injury  Outcome: Ongoing     Problem: Anxiety:  Goal: Level of anxiety will decrease  Description: Level of anxiety will decrease  Outcome: Ongoing     Problem: Bleeding:  Goal: Will show no signs and symptoms of excessive bleeding  Description: Will show no signs and symptoms of excessive bleeding  Outcome: Ongoing     Problem: Pain:  Goal: Pain level will decrease  Description: Pain level will decrease  Outcome: Ongoing  Goal: Control of acute pain  Description: Control of acute pain  Outcome: Ongoing  Goal: Control of chronic pain  Description: Control of chronic pain  Outcome: Ongoing     Problem: Skin Integrity:  Goal: Will show no infection signs and symptoms  Description: Will show no infection signs and symptoms  Outcome: Ongoing  Goal: Absence of new skin breakdown  Description: Absence of new skin breakdown  Outcome: Ongoing     Problem: Musculor/Skeletal Functional Status  Goal: Highest potential functional level  Outcome: Ongoing

## 2021-04-09 NOTE — CARE COORDINATION
Met with patient to discuss transitional planning. Patient relates she intends to go home, not agreeable to rehab or SNF.   Considering home care, has list, will follow up for decision    5422 spoke with patient, she'd like referral to [address] [facility]., referral faxed    80 left VM with [facility] to see if they can accommodate patient

## 2021-04-09 NOTE — PROGRESS NOTES
Physician Progress Note      Mitzi Tanner  CSN #:                  [id number]  :                       1960  ADMIT DATE:       2021 10:27 AM  DISCH DATE:  RESPONDING  PROVIDER #:        Jeb Soni MD          QUERY TEXT:    Patient admitted with aortic stenosis and had a TAVR procedure, noted to have   atrial fibrillation. If possible, please document in progress notes and   discharge summary further specificity regarding the type of atrial   fibrillation: The medical record reflects the following:  Risk Factors: Atrial Fibrillation, HTN,  CHF, HLP, aortic stenosis  Clinical Indicators: Atrial Fibrillation, home dosing of Eliquis, Plavix. controlled rhythm of 70's  Treatment: PO - Eliquis, Lasix, Lopressor, Plavix, TAVR, pacemaker insertion -   temporary    thank you, Ehsan Gibbons, CDS. [phone]    Chronic: nonspecific term that could be referring to paroxysmal, persistent,   or permanent  Longstanding persistent: persistent and continuous, lasting > 1 year. Paroxysmal - self-terminating or intermittent; resolves with or without   intervention within 7 days of onset; may recur with various frequency. Persistent - Fails to terminate within 7 days; Often requires meds or   cardioversion to restore to NSR. Permanent - longstanding & persistent; Medication has been ineffective in   restoring NSR &/or cardioversion is contraindicated    Definitions per MS-DRG Training Guide and Quick Reference Guide, [address] 5   Diseases and Disorders of the Circulatory System; 2019; [employer]. Software content   from the [employer]?  Advanced CDI Transformation Program  Options provided:  -- Paroxysmal Atrial Fibrillation  -- Longstanding Persistent Atrial Fibrillation  -- Permanent Atrial Fibrillation  -- Persistent Atrial Fibrillation  -- Chronic Atrial Fibrillation, unspecified  -- Other - I will add my own diagnosis  -- Disagree - Not applicable / Not valid  -- Disagree - Clinically unable to determine / Unknown  -- Refer to Clinical Documentation Reviewer    PROVIDER RESPONSE TEXT:    This patient has paroxysmal atrial fibrillation. Query created by: Lizett Krueger on 4/7/2021 11:30 AM      QUERY TEXT:    Pt admitted with aortic stenosis and had a TAVR and has CHF documented. If   possible, please document in progress notes and discharge summary further   specificity regarding the type and acuity of CHF:    The medical record reflects the following:  Risk Factors: aortic stenosis, many admissions to manage CHF due to SOB and   swelling, Atrial Fibrillation  Clinical Indicators: SOB - diminished lung sounds,  quinton lower extremity edema   2+, ECHO- 1/8/2021, EF 40-45% with reduced systolic and diastolic   dysfunction. pro bnp 2750  Treatment: O2 @ 1-2 L/NC,  PO Lasix,  Lopressor, Plavix, Eliquis, IV NS and   Vancomycin . TAVR on 4/6. CTS consult. thank you, Joaquin Carolina, [facility]  [phone],  Options provided:  -- Acute on Chronic Systolic CHF/HFrEF  -- Acute on Chronic Diastolic CHF/HFpEF  -- Acute on Chronic Systolic and Diastolic CHF  -- Chronic Systolic CHF/HFrEF  -- Chronic Diastolic CHF/HFpEF  -- Chronic Systolic and Diastolic CHF  -- Other - I will add my own diagnosis  -- Disagree - Not applicable / Not valid  -- Disagree - Clinically unable to determine / Unknown  -- Refer to Clinical Documentation Reviewer    PROVIDER RESPONSE TEXT:    This patient is in acute on chronic systolic CHF/HFrEF.     Query created by: Lizett Krueger on 4/7/2021 11:22 AM      Electronically signed by:  Keith Burton MD 4/9/2021 6:16 AM

## 2021-04-09 NOTE — PROGRESS NOTES
Occupational Therapy   Occupational Therapy Initial Assessment  Date: 2021   Patient Name: Jose Raul Jaimes  MRN: [id number]     : 1960    Date of Service: 2021  Obtained from medical chart:     S/P TAVR (21); S/P Pacemaker (21)    Discharge Recommendations:  Patient would benefit from continued therapy after discharge  OT Equipment Recommendations  Equipment Needed: Yes  Mobility Devices: ADL Assistive Devices  ADL Assistive Devices: Shower Chair with back; Toileting - Drop Arm Commode, Heavy Duty Drop Arm Commode    Assessment   Performance deficits / Impairments: Decreased functional mobility ; Decreased safe awareness;Decreased balance;Decreased ADL status; Decreased endurance;Decreased high-level IADLs;Decreased strength  Assessment: Patient supine in bed, required Max A to don sling on the L UE prior to bed mobility to adhere to precautions. Pt completed bed mobility at Mod A x2 with poor adherence to pacemaker precautions. Pt sat EOB at [address] [place] [address] with intermittent CGA noted to have decreased balance and endurance as observed with R Lateral posterior lean intermittently and difficulty maintaining neutral posture. Pt required Mod A x2 for sit to stand transfer and utilized hand held assist at Mod A to bedside recliner. Pt demonstrates decreased endurance, balance and strength throughout evaluation and difficulty with adhering to pacemaker precautions appropriately. Patient would benefit from continued acute OT services to address functional deficits through skilled intervention of ADL and IADL compensatory training, balance, safety and transfer training, education of EC/WS techs and implementation, use of AE/DME to increase independence, and strengthening activities to improve function for ADLs to promote functional outcomes.    Prognosis: Good  Decision Making: Medium Complexity  Patient Education: OT role, OT POC, purpose of evaluation, importance of OOB activity, pacemaker precautions, donning/doffing of sling, importance of OOB activity, continuation of therapy - good return  REQUIRES OT FOLLOW UP: Yes  Activity Tolerance  Activity Tolerance: Patient limited by fatigue;Patient limited by pain  Safety Devices  Safety Devices in place: Yes  Type of devices: Gait belt;Nurse notified;Call light within reach; Left in chair  Restraints  Initially in place: No         Patient Diagnosis(es): Diagnoses of S/P TAVR (transcatheter aortic valve replacement) and Breast nodule were pertinent to this visit. has a past medical history of Anxiety, Aortic stenosis, severe, Atrial fibrillation ([address] CHF NYHA class III, chronic, combined ([address] Depression, Hyperlipidemia, Hypertension, Hypothyroidism, Moderate mitral regurgitation, Morbid obesity with BMI of 45.0-49.9, adult ([address] Obesity, Sleep apnea, and Thyroid disease. has a past surgical history that includes Cholecystectomy; Upper gastrointestinal endoscopy (N/A, 1/20/2019); and Cardiac catheterization (02/22/2021). Restrictions  Restrictions/Precautions  Restrictions/Precautions: Bedrest with Bathroom Privileges  Required Braces or Orthoses?: Yes  Implants present? : Pacemaker  Required Braces or Orthoses  Left Upper Extremity Brace/Splint: Sling(For 24hrs starting 4/8 @1600)  Position Activity Restriction  Other position/activity restrictions: S/p TAVR (4/6/21); PPM placed (4/8/21)    Subjective   General  Patient assessed for rehabilitation services?: Yes  Family / Caregiver Present: No  Diagnosis: TAVR  General Comment  Comments: RN ok'd pt for OT/PT eval. Patient was pleasant and cooperative.   Patient Currently in Pain: Yes  Pain Assessment  Pain Assessment: Faces  Carter-Baker Pain Rating: Hurts a little bit  Pain Type: Chronic pain  Pain Location: Knee  Pain Orientation: Right;Left  Pain Descriptors: Discomfort  Non-Pharmaceutical Pain Intervention(s): Ambulation/Increased Activity;Repositioned;Splinting(Sling for the L UE)  Response to Pain Intervention: Patient Satisfied  Multiple Pain Sites: No  Vital Signs  Patient Currently in Pain: Yes    Social/Functional History  Social/Functional History  Lives With: Family(79 yo mother)  Type of Home: Mobile home  Home Layout: One level  Home Access: Stairs to enter with rails  Entrance Stairs - Number of Steps: 4  Entrance Stairs - Rails: Left  Bathroom Shower/Tub: Walk-in shower  Bathroom Toilet: Standard  Bathroom Equipment: Grab bars in shower  Receives Help From: Family  ADL Assistance: Independent  Homemaking Assistance: Independent  Homemaking Responsibilities: Yes  Meal Prep Responsibility: Primary  Laundry Responsibility: Primary  Cleaning Responsibility: Primary  Health Care Management: Primary(reports mother is independent, pt completes IADLs)  Ambulation Assistance: Independent(pt independently ambulates with no AD at baseline, pt reports inability to ambulate community distances recently due to bilateral knee pain and endurance deficits)  Transfer Assistance: Independent  Active : Yes  Mode of Transportation: [facility]  Occupation: Full time employment  Type of occupation: desk job  Leisure & Hobbies: reading  Additional Comments: 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     Objective   Vision: Impaired  Vision Exceptions: Wears glasses at all times  Hearing: Within functional limits          Balance  Sitting Balance: Minimal assistance(with intermittent CGA, noted to have a R posterior lean EOB for ~6-7 min)  Standing Balance: Moderate assistance(x2)  Standing Balance  Time: ~1 min prior to functional mobility  Activity: standing w/ RW and Min A before moving to hospital recliner  Functional Mobility  Functional - Mobility Device: No device  Activity: Other(from EOB to bedside recliner)  Assist Level:  Moderate assistance  Functional Mobility Comments: handheld assistance d/t pacemaker precautions  ADL  Feeding: Independent  Grooming: Modified independent ;Setup  UE Bathing: Minimal assistance;Setup  LE Bathing: Setup; Moderate assistance  UE Dressing: Setup;Minimal assistance(OT facilitated donning sling at Max A in supine with education)  LE Dressing: Setup; Moderate assistance(OT facilitated donning socks sitting EOB, attempting to complete using patient's routine with poor success requiring Max A d/t decreased ROM in the the L UE d/t pacemaker precautions)  Toileting: Moderate assistance;Setup  Tone RUE  RUE Tone: Normotonic  Tone LUE  LUE Tone: Normotonic  Coordination  Movements Are Fluid And Coordinated: Yes     Bed mobility  Supine to Sit: Moderate assistance;2 Person assistance  Scooting: Moderate assistance  Comment: Pt required increased time to complete bed mobility with sling donned prior; VCs required throughout to maintain precautions and decrease pulling/pushing on the L shoulder with fair return  Transfers  Sit to stand: 2 Person assistance; Moderate assistance  Stand to sit: Moderate assistance;2 Person assistance     Cognition  Overall Cognitive Status: Exceptions  Memory: Decreased recall of precautions  Safety Judgement: Decreased awareness of need for safety  Problem Solving: Decreased awareness of errors  Insights: Fully aware of deficits  Sequencing: Requires cues for some        Sensation  Overall Sensation Status: WFL      LUE AROM : WFL  Left Hand AROM: WFL  RUE AROM : WFL  Right Hand AROM: WFL  LUE Strength  LUE Strength Comment: not tested d/t pacemaker  RUE Strength  Gross RUE Strength: WFL  R Hand General: 4+/5              Plan   Plan  Times per week: 4x/wk  Current Treatment Recommendations: Strengthening, Endurance Training, Patient/Caregiver Education & Training, Equipment Evaluation, Education, & procurement, Self-Care / ADL, Home Management Training, Safety Education & Training, Functional Mobility Training, Balance Training    AM-PAC Score        AM-[facility] Inpatient Daily Activity Raw Score: 18 (04/09/21 1540)  AM-PAC Inpatient ADL T-Scale Score : 38.66 (04/09/21 1540)  ADL Inpatient CMS 0-100% Score: 46.65 (04/09/21 1540)  ADL Inpatient CMS G-Code Modifier : CK (04/09/21 1540)    Goals  Short term goals  Time Frame for Short term goals: Patient will, by discharge  Short term goal 1: demo bed mobility at [facility] to engage in ADLs  Short term goal 2: demo UB ADLs at [facility] maintaining precautions  Short term goal 3: demo LB ADLs at [facility] A using AE PRN  Short term goal 4: demo functional transfers/mobility using LRD at [facility] to engage in ADLs safely  Short term goal 5: demo 5+ min of dynamic standing tolerance at [facility] to engage in ADLs safely  Patient Goals   Patient goals : return home to be with mother     Therapy Time   Individual Concurrent Group Co-treatment   Time In 1006         Time Out 1041         Minutes 35         Timed Code Treatment Minutes: [address] Hendrickson, OTR/L

## 2021-04-09 NOTE — PROGRESS NOTES
RN called RT to patient's room due to patient repeatedly going apneic and desatting into the 70's. RT discussed the benefits of wearing a BIPAP tonight. Patient refused to wear the BIPAP, stating, \"No. I'm supposed to wear one at home, but I can't sleep with it on. \"

## 2021-04-10 VITALS
HEIGHT: 68 IN | TEMPERATURE: 98.3 F | SYSTOLIC BLOOD PRESSURE: 141 MMHG | OXYGEN SATURATION: 100 % | BODY MASS INDEX: 44.41 KG/M2 | DIASTOLIC BLOOD PRESSURE: 95 MMHG | RESPIRATION RATE: 20 BRPM | WEIGHT: 293 LBS | HEART RATE: 75 BPM

## 2021-04-10 LAB
ANION GAP SERPL CALCULATED.3IONS-SCNC: 15 MMOL/L (ref 9–17)
BUN BLDV-MCNC: 18 MG/DL (ref 8–23)
BUN/CREAT BLD: NORMAL (ref 9–20)
CALCIUM SERPL-MCNC: 9.5 MG/DL (ref 8.6–10.4)
CHLORIDE BLD-SCNC: 103 MMOL/L (ref 98–107)
CO2: 26 MMOL/L (ref 20–31)
CREAT SERPL-MCNC: 0.84 MG/DL (ref 0.5–0.9)
GFR AFRICAN AMERICAN: >60 ML/MIN
GFR NON-AFRICAN AMERICAN: >60 ML/MIN
GFR SERPL CREATININE-BSD FRML MDRD: NORMAL ML/MIN/{1.73_M2}
GFR SERPL CREATININE-BSD FRML MDRD: NORMAL ML/MIN/{1.73_M2}
GLUCOSE BLD-MCNC: 93 MG/DL (ref 70–99)
HCT VFR BLD CALC: 38.9 % (ref 36.3–47.1)
HEMOGLOBIN: 11.4 G/DL (ref 11.9–15.1)
MAGNESIUM: 2.2 MG/DL (ref 1.6–2.6)
MCH RBC QN AUTO: 23.7 PG (ref 25.2–33.5)
MCHC RBC AUTO-ENTMCNC: 29.3 G/DL (ref 28.4–34.8)
MCV RBC AUTO: 80.9 FL (ref 82.6–102.9)
NRBC AUTOMATED: 0 PER 100 WBC
PDW BLD-RTO: 16.2 % (ref 11.8–14.4)
PLATELET # BLD: 193 K/UL (ref 138–453)
PMV BLD AUTO: 10.6 FL (ref 8.1–13.5)
POTASSIUM SERPL-SCNC: 3.7 MMOL/L (ref 3.7–5.3)
RBC # BLD: 4.81 M/UL (ref 3.95–5.11)
SODIUM BLD-SCNC: 144 MMOL/L (ref 135–144)
WBC # BLD: 9.8 K/UL (ref 3.5–11.3)

## 2021-04-10 PROCEDURE — 83735 ASSAY OF MAGNESIUM: CPT

## 2021-04-10 PROCEDURE — 36415 COLL VENOUS BLD VENIPUNCTURE: CPT

## 2021-04-10 PROCEDURE — 97535 SELF CARE MNGMENT TRAINING: CPT

## 2021-04-10 PROCEDURE — 6370000000 HC RX 637 (ALT 250 FOR IP): Performed by: INTERNAL MEDICINE

## 2021-04-10 PROCEDURE — 97110 THERAPEUTIC EXERCISES: CPT

## 2021-04-10 PROCEDURE — 2580000003 HC RX 258: Performed by: INTERNAL MEDICINE

## 2021-04-10 PROCEDURE — 94761 N-INVAS EAR/PLS OXIMETRY MLT: CPT

## 2021-04-10 PROCEDURE — 85027 COMPLETE CBC AUTOMATED: CPT

## 2021-04-10 PROCEDURE — 6370000000 HC RX 637 (ALT 250 FOR IP): Performed by: STUDENT IN AN ORGANIZED HEALTH CARE EDUCATION/TRAINING PROGRAM

## 2021-04-10 PROCEDURE — 6370000000 HC RX 637 (ALT 250 FOR IP): Performed by: NURSE PRACTITIONER

## 2021-04-10 PROCEDURE — 80048 BASIC METABOLIC PNL TOTAL CA: CPT

## 2021-04-10 PROCEDURE — 97116 GAIT TRAINING THERAPY: CPT

## 2021-04-10 RX ORDER — CLOPIDOGREL BISULFATE 75 MG/1
75 TABLET ORAL DAILY
Qty: 30 TABLET | Refills: 3 | Status: SHIPPED | OUTPATIENT
Start: 2021-04-11

## 2021-04-10 RX ORDER — OXYCODONE HYDROCHLORIDE AND ACETAMINOPHEN 5; 325 MG/1; MG/1
1 TABLET ORAL EVERY 8 HOURS PRN
Qty: 21 TABLET | Refills: 0 | Status: CANCELLED | OUTPATIENT
Start: 2021-04-10 | End: 2021-04-17

## 2021-04-10 RX ADMIN — Medication 81 MG: at 08:00

## 2021-04-10 RX ADMIN — OXYCODONE HYDROCHLORIDE AND ACETAMINOPHEN 2 TABLET: 5; 325 TABLET ORAL at 01:47

## 2021-04-10 RX ADMIN — LISINOPRIL 20 MG: 20 TABLET ORAL at 08:00

## 2021-04-10 RX ADMIN — PANTOPRAZOLE SODIUM 40 MG: 40 TABLET, DELAYED RELEASE ORAL at 06:25

## 2021-04-10 RX ADMIN — SODIUM CHLORIDE, PRESERVATIVE FREE 10 ML: 5 INJECTION INTRAVENOUS at 10:30

## 2021-04-10 RX ADMIN — FUROSEMIDE 20 MG: 20 TABLET ORAL at 07:59

## 2021-04-10 RX ADMIN — LEVOTHYROXINE SODIUM 125 MCG: 125 TABLET ORAL at 07:59

## 2021-04-10 RX ADMIN — BUPROPION HYDROCHLORIDE 150 MG: 150 TABLET, EXTENDED RELEASE ORAL at 07:59

## 2021-04-10 RX ADMIN — APIXABAN 5 MG: 5 TABLET, FILM COATED ORAL at 10:29

## 2021-04-10 RX ADMIN — PANTOPRAZOLE SODIUM 40 MG: 40 TABLET, DELAYED RELEASE ORAL at 16:32

## 2021-04-10 RX ADMIN — CLOPIDOGREL 75 MG: 75 TABLET, FILM COATED ORAL at 08:00

## 2021-04-10 RX ADMIN — OXYCODONE HYDROCHLORIDE AND ACETAMINOPHEN 2 TABLET: 5; 325 TABLET ORAL at 07:59

## 2021-04-10 RX ADMIN — METOPROLOL TARTRATE 25 MG: 25 TABLET ORAL at 10:29

## 2021-04-10 ASSESSMENT — PAIN DESCRIPTION - PAIN TYPE
TYPE: CHRONIC PAIN
TYPE: ACUTE PAIN

## 2021-04-10 ASSESSMENT — PAIN SCALES - GENERAL
PAINLEVEL_OUTOF10: 5
PAINLEVEL_OUTOF10: 6
PAINLEVEL_OUTOF10: 5
PAINLEVEL_OUTOF10: 7

## 2021-04-10 ASSESSMENT — PAIN DESCRIPTION - LOCATION
LOCATION: GROIN
LOCATION: GENERALIZED
LOCATION: GROIN
LOCATION: GENERALIZED
LOCATION: GENERALIZED

## 2021-04-10 ASSESSMENT — PAIN DESCRIPTION - ORIENTATION: ORIENTATION: LEFT

## 2021-04-10 NOTE — PROGRESS NOTES
Occupational Therapy  Fac[facility]/Department[facility]  Daily Treatment Note  NAME: Viet Hannon  : 1960  MRN: [id number]  Date of Service: 4/10/2021  Discharge Recommendations:  Patient would benefit from continued therapy after discharge, Pt. Needing A for LB ADL completion, Pt. Would benefit from AE for LB ADL completion. OT Equipment Recommendations  Equipment Needed: Yes  Mobility Devices: Walker  ADL Assistive Devices: Shower Chair with back; Toileting - Drop Arm Commode, Heavy Duty Drop Arm Commode;Sock-Aid Hard;Reacher  Other: Pt. thinks she [facility] walk[facility] at [facility], however not sure. Assessment   Performance deficits / Impairments: Decreased functional mobility ; Decreased safe awareness;Decreased balance;Decreased ADL status; Decreased endurance;Decreased high-level IADLs;Decreased strength  Prognosis: Good  Decision Making: Medium Complexity  Patient Education: OT role, OT POC, purpose of tx, importance of OOB activity, pacemaker precautions, donning/doffing of sling, importance of OOB activity, continuation of therapy, AE options for LB dressing - good return  REQUIRES OT FOLLOW UP: Yes  Activity Tolerance  Activity Tolerance: Patient limited by fatigue;Patient Tolerated treatment well  Safety Devices  Safety Devices in place: Yes  Type of devices: Gait belt;Nurse notified;Call light within reach; Left in chair  Restraints  Initially in place: No     Patient Diagnosis(es): Diagnoses of S/P TAVR (transcatheter aortic valve replacement) and Breast nodule were pertinent to this visit. has a past medical history of Anxiety, Aortic stenosis, severe, Atrial fibrillation ([facility] [address] CHF NYHA class III, chronic, combined ([facility] [address] Depression, Hyperlipidemia, Hypertension, Hypothyroidism, Moderate mitral regurgitation, Morbid obesity with BMI of 45.0-49.9, adult ([facility] [address] Obesity, Sleep apnea, and Thyroid disease. has a past surgical history that includes Cholecystectomy;  Upper gastrointestinal endoscopy (N/A, Independent(No LOB sitting unsupported in recliner chair.)  Standing Balance: Contact guard assistance  Standing Balance  Time: ~10 minutes with 2 sitting rest breaks  Activity: Functioinal mobility, standing at sink for grooming, toileting. Comment: RW, CGA to ensure no LOB. Increased SOB noted  Functional Mobility  Functional - Mobility Device: Rolling Walker  Activity: To/from bathroom  Assist Level: Contact guard assistance  Functional Mobility Comments: Pt educated on no wt. via L UE during RW usage, to use RW as a guide to prevent LOB- G carryover. Toilet Transfers  Toilet - Technique: Stand pivot  Equipment Used: Extra wide bedside commode  Toilet Transfer: Contact guard assistance;Stand by assistance  Toilet Transfers Comments: Recliner<->BSC, CGA-SBA + RW  Bed mobility  Comment: N/T. Pt. up in recliner upon entering and exit. Per pt. she plans on sleeping in her recliner at home for 1-2 months. Transfers  Sit Pivot Transfers: Contact guard assistance  Sit to stand: Contact guard assistance  Stand to sit: Contact guard assistance  Transfer Comments: pace maker prec-G carryover .       Cognition  Overall Cognitive Status: Exceptions  Memory: Decreased recall of precautions  Safety Judgement: Decreased awareness of need for safety  Problem Solving: Decreased awareness of errors  Insights: Fully aware of deficits  Sequencing: Requires cues for some      Plan   Plan  Times per week: 4x/wk  Current Treatment Recommendations: Strengthening, Endurance Training, Patient/Caregiver Education & Training, Equipment Evaluation, Education, & procurement, Self-Care / ADL, Home Management Training, Safety Education & Training, Functional Mobility Training, Balance Training    Goals  Short term goals  Time Frame for Short term goals: Patient will, by discharge  Short term goal 1: demo bed mobility at [place] [address] to engage in ADLs  Short term goal 2: demo UB ADLs at [place] [address] maintaining precautions  Short term goal 3: demo LB ADLs at [facility] using AE PRN  Short term goal 4: demo functional transfers/mobility using LRD at [facility] to engage in ADLs safely  Short term goal 5: demo 5+ min of dynamic standing tolerance at [facility] to engage in ADLs safely  Patient Goals   Patient goals : return home to be with mother       Therapy Time   Individual Concurrent Group Co-treatment   Time In 1159         Time Out 1224         Minutes 25         Timed Code Treatment Minutes: Rustam 455, R Shanti Smart 46

## 2021-04-10 NOTE — CARE COORDINATION
Discharge [facility] Case Management Department  Written by: Sasha Zimmerman RN    Patient Name: Karla Yanez  Attending Provider: No att. providers found  Admit Date: 2021 10:27 AM  MRN: [id number]  Account: [de-identified]                     : 1960  Discharge Date: 4/10/2021      Disposition: home with [facility] Leonel Jenkins RN

## 2021-04-10 NOTE — PROGRESS NOTES
Perfect serve sent to fellow attending to clarify whether or not patient is to continue taking eliquis bid along with plavix that is due to start tomorrow, along with patient request for a [facility] [place] for tylenol 3, does not want percocet or norco.

## 2021-04-10 NOTE — PROGRESS NOTES
Pacemaker  Required Braces or Orthoses  Left Upper Extremity Brace/Splint: Sling(Only at night when sleeping per RN.)  Position Activity Restriction  Other position/activity restrictions: S/p TAVR (4/6/21); PPM placed (4/8/21)  Subjective   General  Chart Reviewed: Yes  Response To Previous Treatment: Patient with no complaints from previous session. Family / Caregiver Present: No  Subjective  Subjective: RN and pt agreeable to PT this morning. Pt sitting up in chair upon arrival with c/o 8/10 pain in groin. Pt pleasant and cooperative throughout. Pain Screening  Patient Currently in Pain: Yes  Pain Assessment  Pain Assessment: 0-10  Pain Level: 8  Patient's Stated Pain Goal: No pain  Pain Type: Acute pain  Pain Location: Groin  Pain Orientation: Left  Vital Signs  Patient Currently in Pain: Yes       Orientation  Orientation  Overall Orientation Status: Within Functional Limits  Cognition   Cognition  Overall Cognitive Status: Exceptions  Memory: Decreased recall of precautions  Safety Judgement: Decreased awareness of need for safety  Problem Solving: Decreased awareness of errors  Insights: Fully aware of deficits  Objective   Bed mobility  Supine to Sit: Unable to assess  Sit to Supine: Unable to assess  Comment: Pt up in chair upon entry/exit this date. Transfers  Sit to Stand: Contact guard assistance  Stand to sit: Contact guard assistance  Comment: Pt required verbal cueing for sequencing with fair return demo. Ambulation  Ambulation?: Yes  Ambulation 1  Surface: level tile  Device: Rolling Walker  Assistance: Contact guard assistance  Quality of Gait: Forward flexed posture  Gait Deviations: Slow Radha;Decreased step length  Distance: ~50ft  Comments: Pt states the distance ambulated is baseline for her. She stated she was less out of breath walking today than before.   Stairs/Curb  Stairs?: No     Balance  Posture: Fair  Sitting - Static: Good  Sitting - Dynamic: Good;-  Standing - Static: Fair;-  Standing - Dynamic: Poor;+  Comments: Standing balance assessed w/ RW      Exercises:  Seated LE exercise program: Long Arc Quads, hip abduction/adduction, heel/toe raises, and marches on RLE only. Reps: 15x each  Comments: Unable to tolerate LLE marches d/t groin main. Goals  Short term goals  Time Frame for Short term goals: 15  Short term goal 1: Pt to perform bed mobility CGA  Short term goal 2: Pt to demonstrate functional transfers CGA  Short term goal 3: Ambulate 100ft w/ least restrictive AD CGA  Short term goal 4: Tolerate 30 minutes of therapy to demo increased endurance  Patient Goals   Patient goals :  To go home    Plan    Plan  Times per week: 5-6x/week  Current Treatment Recommendations: Strengthening, Transfer Training, Endurance Training, Patient/Caregiver Education & Training, ROM, Balance Training, Gait Training, Home Exercise Program, Functional Mobility Training, Stair training, Safety Education & Training  Safety Devices  Type of devices: Left in chair, Call light within reach, Gait belt, Nurse notified  Restraints  Initially in place: No     Therapy Time   Individual Concurrent Group Co-treatment   Time In 1130         Time Out 1153         Minutes 23         Timed Code Treatment Minutes: [facility] JAYNE Hodge

## 2021-04-10 NOTE — PROGRESS NOTES
CLINICAL PHARMACY NOTE: MEDS TO [address] Select Patient?: No  Total # of Prescriptions Filled: 1   The following medications were delivered to the patient:  · plavix  Total # of Interventions Completed: 0  Time Spent (min): 0    Additional Documentation:

## 2021-04-10 NOTE — PROGRESS NOTES
Dr Des Au spoke with patient over the phone to clarify medication questions for pain medication and was told to take eliquis and plavix , patient denies further questions with discharge instructions and verbalizes understanding and denies further questions.

## 2021-04-10 NOTE — CARE COORDINATION
Faxed order for rolling walker to [employer]-talked with Jaylen-needs to be ordered due to patient's size-will deliver to home when it comes in. Called [facility] to see if they are accepting patient-they will call back. [phone] called Saba with [facility]-they are out of network-don't accept her insurance. Called and left message with patient    [phone] called patient-explained that [facility] is out of network with her insurance. Need new choice. Patient doesn't know of any. 1810 called [facility] office-can accept patient. Called Sunny Chow to see if OK with this company-states yes. Faxed face sheet, home care order, and AVS to [facility].  Left message with office

## 2021-04-10 NOTE — FLOWSHEET NOTE
Notified patient that walker will not be able to be delivered until this Monday or Tuesday, patient states that she does not feel that she needs a walker and is able to use her mothers if need be and is still requesting to go home as planned today. And feels fine ambulating in room without assistance.

## 2021-04-10 NOTE — DISCHARGE SUMMARY
tablet  Take 150 mg by mouth daily             clopidogrel (PLAVIX) 75 MG tablet  Take 1 tablet by mouth daily             furosemide (LASIX) 20 MG tablet  Take 20 mg by mouth daily             levothyroxine (SYNTHROID) 50 MCG tablet               lisinopril (PRINIVIL;ZESTRIL) 20 MG tablet  Take 20 mg by mouth daily             metoprolol tartrate (LOPRESSOR) 25 MG tablet  Take 25 mg by mouth 2 times daily             omeprazole (PRILOSEC) 40 MG delayed release capsule  Take 40 mg by mouth daily             spironolactone (ALDACTONE) 25 MG tablet  Take 25 mg by mouth daily                The patient is a 57 y.o.  female who is admitted to the hospital for TAVR     Patient is known now with SOB with minimal exertion, with recurrent CHF and LE edema. She gained many pounds due to CHF and fluid overload despite trx with dieretics.     She is known with multiple co - morbid factors,     TAVR 4/6/21  IMPRESSION:    1. Successful Transcatheter Artic valve replacement  2. Successful temporary pacer implant: Left in place due to dependency  3. Successful closure all access with no complications.     RECOMMENDATIONS:  1. Post TAVR protocol  2. Temporary pacer management if stayed in place. 3. TTE in 24 hours. Post TAVR continue to have issues with bradycardia. Temp PPM was continued and ultimately required PPM placement. PPM placement 4/8/21  Implants:  Medtronic Gita XT DR VILLALBA  Medtronic [id number] lead at RA appendage  Medtronic [id number] lead at high RV septum         FINDINGS:  All impedances and thresholds at RA and RV septum remained stable and WNL.  The Medtronc [place] XT DR VILLALBA pacemaker is programmed in DDD mode, 75/110 ppm, with mode switching active. Post TAVR echo reviewed with Dr. Elysia Henderson. EF 55%, AV peak 2.5m/s, mean gradient 17, no regurg      Discussed with patient and nursing. Medications and discharge instructions reviewed with patient and nursing. Pt. Refusing SNF placement.  Agreeable only to VNS and home PT/OT. Discussed in detail with patient post procedure activity restriction including but not limited to site care, diet, exercise/activity restrictions, lifting and arm movement restrictions, pain control, use of ice for swelling, and follow-up. Questions/concerns addressed in detail. Discussed in detail with patient post cath POC including but not limited to medications, diet, exercise, b/l femoral artery site care, and follow-up. Questions and concerns addressed. OK for discharge home today. F/U in office as scheduled.      Electronically signed by RADHA Montilla CNP on 4/10/2021 at 11:26 AM  [facility]      [phone]

## 2021-04-10 NOTE — FLOWSHEET NOTE
Patient discharge instructions reviewed with patient and daughter, clarification to be sent via perfect serve to attending on taking eliquis and plavix together and request prescription for pain medication tylenol 3

## 2021-04-10 NOTE — PROGRESS NOTES
Basia Ozuna was evaluated today and a DME order was entered for a wheeled walker because she requires this to successfully complete daily living tasks of bathing, personal cares and ambulating. A wheeled walker is necessary due to the patient's unsteady gait, upper body weakness, and inability to  an ambulation device; and she can ambulate only by pushing a walker instead of a lesser assistive device such as a cane, crutch, or standard walker. The need for this equipment was discussed with the patient and she understands and is in agreement.

## 2021-04-20 NOTE — PROCEDURES
[facility]                            CARDIAC CATHETERIZATION    PATIENT NAME: Cristina Landeros                        :        1960  MED REC NO:   [id number]                             ROOM:       [id number]  ACCOUNT NO:   [de-identified]                           ADMIT DATE: 2021  PROVIDER:     Dale Palm    DATE OF PROCEDURE:  2021    PROCEDURES:  1. Implantation of dual-chamber pacemaker. 2.  Conscious sedation. 3.  Intraoperative lead testing. PREOPERATIVE DIAGNOSES:  1. Persistent third-degree AV block. 2.  Persistent atrial fibrillation. 3.  Aortic stenosis, status post power TAVR. POSTOPERATIVE DIAGNOSES:  1. Persistent third-degree AV block. 2.  Persistent atrial fibrillation. 3.  Aortic stenosis, status post power TAVR. PULSE GENERATOR:  Medtronic [place] XT DR ORLY, serial number [id number],  implanted 2021. LEADS:  1. Atrial lead, Medtronic [id number], serial number [id number], implanted  2021.  2.  The right ventricular lead is Medtronic [id number], serial number  [id number], implanted on 2021. ACCESS:  Left subclavian vein (7-Brazilian sheath x2). ANESTHESIA:  General endotracheal anesthesia per Anesthesia Service. COMPLICATIONS:  None. EBL: 10 mL. PROCEDURE:  After informed consent was obtained, the patient was brought  to the electrophysiology laboratory in the fasting, unsedated state. With the anesthesia service present, she underwent sedation and  endotracheal intubation and remained stable under general anesthetic  throughout the entire case. She was prepped and draped in sterile  fashion for left pectoral device implant. A total of 10 mL of 1% aqueous lidocaine was used to infiltrate  subcutaneous tissues of the left infraclavicular fossa.   Using a  standard 15-scalpel blade, a 2-inch incision was developed parallel to  the left clavicle and approximately 1 inch inferior to it. The depth of  the incision was carried down to the plane of the prepectoral fascia  using blunt dissection and Bovie cautery. An additional 40 mL of 1%  aqueous lidocaine was then used to infiltrate subcutaneous tissues of  the prepectoral fascia, spanning approximately 6 cm inferior to the  incision. Using blunt dissection and Bovie cautery, a subcutaneous  pocket was then created spanning approximately 5-6 cm inferior to the  incision, along the plane of the prepectoral fascia. Hemostasis was  maintained with Bovie cautery and the pocket was irrigated in antibiotic  saline solution. Using an 18-gauge Cook needle with modified Seldinger technique, two  separate punctures were then made in the left subclavian vein with easy  passage of a 0.038-inch J guidewire through each puncture site. Guidewire positions were verified in the left subclavian vein and  superior vena cava. Over the first guidewire, a Medtronic 7-Chinese introducer sheath was  advanced under fluoroscopic view into the left subclavian vein. From  this sheath, guidewire and obturator core were removed with good blood  return observed and, through this sheath, a Medtronic 6746-74 lead was  then advanced under fluoroscopic view to the right ventricular apex. The sheath was peeled away and discarded. To obtain optimal R-wave sensing, the lead was moved up to the high RV  septum, where good R-wave sensing was obtained and the active fixation  mechanism was then deployed using 12 clockwise turns with the Medtronic  torque tool over the connector pin of the lead. Deployment of the  active fixation mechanism was observed under fluoroscopic view, and as  the stylet was carefully removed from the lead under fluoroscopic view,  the lead was judged to be firmly fixed to the ventricular myocardium.    Pacing and sensing thresholds were found to be good and there was no  evidence of phrenic or diaphragmatic pacing at 10-volt pacing. The lead  was stable with deep inspiration and was sutured to the prepectoral  fascia using 3-0 silk sutures over the suture sleeve of the lead. Over the remaining guidewire, under fluoroscopic view, a second 7-Japanese  introducer sheath was then advanced under fluoroscopic view into the  left subclavian vein. From this sheath, guidewire and obturator core  were removed with good blood return observed and, through this sheath, a  Medtronic 6930-31 lead was advanced under fluoroscopic view to the right  atrium. The sheath was peeled away and discarded. The lead was  positioned to the right atrial appendage, where good atrial sensing was  obtained with the patient remaining in persistent atrial fibrillation. Under direct fluoroscopic view, the active fixation mechanism was then  deployed using 12 clockwise turns with the Medtronic torque tool over  the connector pin of the lead. Deployment of the active fixation  mechanism was observed under fluoroscopic view, and as the stylet was  carefully removed from the lead under fluoroscopic view, the lead was  judged to be firmly fixed to the atrial myocardium. Sensing threshold  and impedance were stable and within normal range. The lead was sutured  to the prepectoral fascia using 3-0 silk sutures over the suture sleeve  of the lead. The pocket was again irrigated in antibiotic saline solution and  hemostasis was maintained with Bovie cautery. Connector pins of the  leads were wiped clean and dry and were placed into the appropriate  header ports of the new device, [employer] [place] XT DR MRI. All set  screws were carefully fastened over each connector pin with the  [employer] torque wrench over each set screw, and each connector pin was  then judged to be firmly fixed inside the header port by virtue of firm  traction placed upon each pin. At this point, appropriate pacing was  observed on monitor, indicating normal device hookup. The device was  carefully placed in the subcutaneous pocket with etching facing outward. The pocket was closed in a deep layer of interrupted 2-0 Vicryl suture,  an intermediate layer of interrupted 3-0 Vicryl suture, and a  superficial layer of running 4-0 Vicryl subcuticular suture. The wound  was dressed in Steri-Strips and sterile gauze. The patient was taken in  good condition to the recovery area where findings were discussed with  the patient and family. FINDINGS:  1. After device hookup in the right atrium, the sensing threshold was  3.3 mV with a pacing impedance of 931 ohms (the patient was in  persistent atrial fibrillation). 2.  In the right ventricle, the pacing impedance was 855 ohms with a  pacing threshold of 0.5 volts at 0.4 milliseconds. 3.  Final parameter settings show a pacing mode of DDD, lower rate of  75, upper rate of 110 with AV delay set at 180/150 milliseconds. Pacing  output for both chambers is 3.5 volts at 0.4 milliseconds with atrial  sensitivity of 0.3 mV and ventricular sensitivity of 0.9 mV.         Lou Feliz    D: 04/19/2021 11:36:08       T: 04/19/2021 11:42:05     [email or web address]  Job#: [id number]     Doc#: [id number]    CC:  Russ Banegas

## 2021-05-25 NOTE — OP NOTE
[facility]                                OPERATIVE REPORT    PATIENT NAME: Caden Galeana                        :        1960  MED REC NO:   [id number]                             ROOM:       [id number]  ACCOUNT NO:   [de-identified]                           ADMIT DATE: 2021  PROVIDER:     Carmen Barroso MD    DATE OF PROCEDURE:  2021    SURGEON/CO-SURGEON:  Chandana Menjivar MD/Zafar Wiggins MD    PREOPERATIVE DIAGNOSES:  Severe aortic stenosis, frailty, and multiple  comorbid factors including hypertension, atrial fibrillation, morbid  obesity, sleep apnea, anemia making surgery higher risk. POSTOPERATIVE DIAGNOSES:  Severe aortic stenosis, frailty, and multiple  comorbid factors including hypertension, atrial fibrillation, morbid  obesity, sleep apnea, anemia making surgery higher risk. PROCEDURES:  Transcatheter aortic valve replacement using a 34-mm  CoreValve through the right femoral artery approach, left femoral  arterial access for placing a pigtail catheter in the noncoronary cusp,  selective angiography, multiple root injections, and temporary venous  pacemaker placed in the left femoral vein. COMPLICATIONS:  None. CONDITION:  Stable. DISPOSITION:  To CVICU. ESTIMATED BLOOD LOSS:  Not applicable. ANESTHESIA:  General endotracheal.    INDICATIONS FOR SURGERY:  The patient is a 65-year-old woman with  multiple comorbid factors who was at high risk for aortic valve stenosis  with surgical aortic valve replacement and therefore was put forward for  TAVR, which she was taken for with the consent of she and her family on  2021. FINDINGS AT SURGERY:  There was a well-placed valve with no significant  perivalvular leak and no significant residual gradient.     SURGERY IN DETAIL:  The patient was identified in her bed in the [facility] and  was transported to the cath lab, where she was induced for general  endotracheal anesthesia without difficulty. This included an uneventful  endotracheal intubation, the appropriate placement of lines and access. The procedure was then began with Dr. Monika Lucero accessing both femoral  arteries as well as the left femoral vein and placing temporary venous  pacemaker placed in the left femoral vein. He then placed a pigtail  catheter to the left femoral artery and placed this in the noncoronary  sinus for marker for the remainder of the procedure. At this point, we  used percutaneous closure devices on the right femoral artery and  dilated up the artery for placement of the appropriate sheath. At this  point, I scrubbed into the procedure and I assisted in preparing the  valve. Once the valve was prepared with Dr. Monika Lucero in position 1 and  myself in position 2, we simultaneously put the valve in place and I  deployed the valve in position 2. There was a good result with the  above-mentioned echocardiographic and hemodynamic findings. We thus  concluded by using ProGlide and Angio-Seal devices on the right and left  arteries respectively and the procedure was concluded and the patient  was transported to CVICU in stable condition. PRANEETH Davies MD    D: 05/24/2021 16:24:12       T: 05/24/2021 22:30:14     [id number]  Job#: [id number]     Doc#: [id number]    CC:

## 2021-06-15 ENCOUNTER — TELEPHONE (OUTPATIENT)
Dept: CARDIOLOGY CLINIC | Age: 61
End: 2021-06-15

## 2022-06-01 LAB — TSH SERPL DL<=0.05 MIU/L-ACNC: 3.4 UIU/ML (ref 0.36–3.74)

## 2023-12-07 PROBLEM — E66.09 CLASS 1 OBESITY DUE TO EXCESS CALORIES WITH SERIOUS COMORBIDITY IN ADULT: Status: ACTIVE | Noted: 2023-12-07

## 2023-12-07 PROBLEM — I48.0 PAROXYSMAL A-FIB (HCC): Status: ACTIVE | Noted: 2023-12-07

## 2023-12-07 PROBLEM — I10 HYPERTENSION, ESSENTIAL: Status: ACTIVE | Noted: 2023-12-07

## 2023-12-07 PROBLEM — R06.02 SOB (SHORTNESS OF BREATH): Status: ACTIVE | Noted: 2023-12-07

## 2023-12-07 PROBLEM — G47.33 OSA (OBSTRUCTIVE SLEEP APNEA): Status: ACTIVE | Noted: 2023-12-07

## 2025-02-03 NOTE — H&P
[facility]  Procedure History and Physical Update          Patient Name: Cristy Davison  MRN:    [id number]  YOB: 1960  Date of evaluation:  2/22/2021    Procedure:           RHC/LHC    Indication for procedure:  Severe AS       Please refer to the office note completed by Dr.M Chapman in the medical record and note that:    [x] I have examined the patient and reviewed the H&P/Consult and there are no changes to be made to the assessment or plan. [] I have examined the patient and reviewed the H&P/Consult and have noted the following changes:    Past Medical History:   Diagnosis Date    Anxiety     Depression     Hyperlipidemia     Thyroid disease        Past Surgical History:   Procedure Laterality Date    CHOLECYSTECTOMY      UPPER GASTROINTESTINAL ENDOSCOPY N/A 1/20/2019    EGD DIAGNOSTIC ONLY performed by Jairo Varner MD at [facility]       No family history on file. Allergies   Allergen Reactions    Codeine        Prior to Admission medications    Medication Sig Start Date End Date Taking? Authorizing Provider   lisinopril (PRINIVIL;ZESTRIL) 40 MG tablet Take 40 mg by mouth daily    Historical Provider, MD   buPROPion (WELLBUTRIN XL) 300 MG extended release tablet Take 300 mg by mouth every morning    Historical Provider, MD   FLUoxetine HCl (PROZAC PO) Take by mouth    Historical Provider, MD   levothyroxine (SYNTHROID) 25 MCG tablet Take 25 mcg by mouth Daily    Historical Provider, MD   SPIRONOLACTONE PO Take by mouth    Historical Provider, MD   sucralfate (CARAFATE) 1 GM tablet Take 1 tablet by mouth 4 times daily 1/20/19   Donna Ryder MD   pantoprazole (PROTONIX) 20 MG tablet Take 1 tablet by mouth daily 1/20/19   Donna Ryder MD         There were no vitals filed for this visit.     Constitutional and General Appearance:   alert, cooperative, no distress and appears stated age  [de-identified]:  · PERRL, EOMI  Respiratory:  · Normal excursion and expansion without use CN Testing: B/L Frontalis intact; B/L buccinator intact; smile symmetrical; tongue protrusion at midline; B/L eyes open/close intact; Shoulder elevation: intact bilaterally; Vision Quadrant Test: intact on R eye, not tested on L eye secondary pt complains chronic visual impairment.

## (undated) DEVICE — GLOVE SURG SZ 8 L12IN FNGR THK79MIL GRN LTX FREE